# Patient Record
Sex: MALE | Race: WHITE | Employment: FULL TIME | URBAN - METROPOLITAN AREA
[De-identification: names, ages, dates, MRNs, and addresses within clinical notes are randomized per-mention and may not be internally consistent; named-entity substitution may affect disease eponyms.]

---

## 2019-02-05 ENCOUNTER — OFFICE VISIT (OUTPATIENT)
Dept: ORTHOPEDIC SURGERY | Age: 44
End: 2019-02-05
Payer: COMMERCIAL

## 2019-02-05 VITALS — HEIGHT: 71 IN | BODY MASS INDEX: 25.62 KG/M2 | WEIGHT: 183 LBS

## 2019-02-05 DIAGNOSIS — M25.522 LEFT ELBOW PAIN: Primary | ICD-10-CM

## 2019-02-05 DIAGNOSIS — M77.12 LATERAL EPICONDYLITIS OF LEFT ELBOW: ICD-10-CM

## 2019-02-05 PROCEDURE — 99203 OFFICE O/P NEW LOW 30 MIN: CPT | Performed by: ORTHOPAEDIC SURGERY

## 2019-02-05 RX ORDER — METHYLPREDNISOLONE 4 MG/1
TABLET ORAL
Qty: 1 KIT | Refills: 0 | Status: SHIPPED | OUTPATIENT
Start: 2019-02-05 | End: 2019-02-11

## 2019-02-18 DIAGNOSIS — M77.12 LATERAL EPICONDYLITIS OF LEFT ELBOW: Primary | ICD-10-CM

## 2019-02-19 ENCOUNTER — TELEPHONE (OUTPATIENT)
Dept: ORTHOPEDIC SURGERY | Age: 44
End: 2019-02-19

## 2019-02-25 ENCOUNTER — OFFICE VISIT (OUTPATIENT)
Dept: ORTHOPEDIC SURGERY | Age: 44
End: 2019-02-25
Payer: COMMERCIAL

## 2019-02-25 ENCOUNTER — TELEPHONE (OUTPATIENT)
Dept: ORTHOPEDIC SURGERY | Age: 44
End: 2019-02-25

## 2019-02-25 VITALS
WEIGHT: 182.98 LBS | HEIGHT: 71 IN | HEART RATE: 65 BPM | BODY MASS INDEX: 25.62 KG/M2 | DIASTOLIC BLOOD PRESSURE: 81 MMHG | SYSTOLIC BLOOD PRESSURE: 123 MMHG

## 2019-02-25 DIAGNOSIS — M77.12 LATERAL EPICONDYLITIS OF LEFT ELBOW: Primary | ICD-10-CM

## 2019-02-25 PROCEDURE — 99213 OFFICE O/P EST LOW 20 MIN: CPT | Performed by: ORTHOPAEDIC SURGERY

## 2019-02-27 ENCOUNTER — ANESTHESIA EVENT (OUTPATIENT)
Dept: OPERATING ROOM | Age: 44
End: 2019-02-27
Payer: COMMERCIAL

## 2019-02-28 ENCOUNTER — HOSPITAL ENCOUNTER (OUTPATIENT)
Age: 44
Setting detail: OUTPATIENT SURGERY
Discharge: HOME | End: 2019-02-28
Attending: ORTHOPAEDIC SURGERY | Admitting: ORTHOPAEDIC SURGERY
Payer: COMMERCIAL

## 2019-02-28 ENCOUNTER — ANESTHESIA (OUTPATIENT)
Dept: OPERATING ROOM | Age: 44
End: 2019-02-28
Payer: COMMERCIAL

## 2019-02-28 VITALS
HEART RATE: 67 BPM | DIASTOLIC BLOOD PRESSURE: 74 MMHG | OXYGEN SATURATION: 97 % | SYSTOLIC BLOOD PRESSURE: 118 MMHG | RESPIRATION RATE: 16 BRPM | HEIGHT: 71 IN | BODY MASS INDEX: 25.48 KG/M2 | TEMPERATURE: 97 F | WEIGHT: 182 LBS

## 2019-02-28 VITALS
DIASTOLIC BLOOD PRESSURE: 56 MMHG | RESPIRATION RATE: 13 BRPM | SYSTOLIC BLOOD PRESSURE: 101 MMHG | OXYGEN SATURATION: 98 %

## 2019-02-28 DIAGNOSIS — M77.12 LATERAL EPICONDYLITIS OF LEFT ELBOW: Primary | ICD-10-CM

## 2019-02-28 PROCEDURE — 7100000001 HC PACU RECOVERY - ADDTL 15 MIN: Performed by: ORTHOPAEDIC SURGERY

## 2019-02-28 PROCEDURE — 2580000003 HC RX 258: Performed by: ANESTHESIOLOGY

## 2019-02-28 PROCEDURE — 3700000000 HC ANESTHESIA ATTENDED CARE: Performed by: ORTHOPAEDIC SURGERY

## 2019-02-28 PROCEDURE — 2500000003 HC RX 250 WO HCPCS

## 2019-02-28 PROCEDURE — 3600000012 HC SURGERY LEVEL 2 ADDTL 15MIN: Performed by: ORTHOPAEDIC SURGERY

## 2019-02-28 PROCEDURE — 3600000002 HC SURGERY LEVEL 2 BASE: Performed by: ORTHOPAEDIC SURGERY

## 2019-02-28 PROCEDURE — 7100000011 HC PHASE II RECOVERY - ADDTL 15 MIN: Performed by: ORTHOPAEDIC SURGERY

## 2019-02-28 PROCEDURE — 7100000000 HC PACU RECOVERY - FIRST 15 MIN: Performed by: ORTHOPAEDIC SURGERY

## 2019-02-28 PROCEDURE — 2500000003 HC RX 250 WO HCPCS: Performed by: ORTHOPAEDIC SURGERY

## 2019-02-28 PROCEDURE — 2500000003 HC RX 250 WO HCPCS: Performed by: ANESTHESIOLOGY

## 2019-02-28 PROCEDURE — 6360000002 HC RX W HCPCS

## 2019-02-28 PROCEDURE — 2580000003 HC RX 258

## 2019-02-28 PROCEDURE — 7100000010 HC PHASE II RECOVERY - FIRST 15 MIN: Performed by: ORTHOPAEDIC SURGERY

## 2019-02-28 PROCEDURE — 2709999900 HC NON-CHARGEABLE SUPPLY: Performed by: ORTHOPAEDIC SURGERY

## 2019-02-28 PROCEDURE — 3700000001 HC ADD 15 MINUTES (ANESTHESIA): Performed by: ORTHOPAEDIC SURGERY

## 2019-02-28 PROCEDURE — 6360000002 HC RX W HCPCS: Performed by: ANESTHESIOLOGY

## 2019-02-28 RX ORDER — LIDOCAINE HYDROCHLORIDE 10 MG/ML
1 INJECTION, SOLUTION EPIDURAL; INFILTRATION; INTRACAUDAL; PERINEURAL
Status: COMPLETED | OUTPATIENT
Start: 2019-02-28 | End: 2019-02-28

## 2019-02-28 RX ORDER — PROPOFOL 10 MG/ML
INJECTION, EMULSION INTRAVENOUS PRN
Status: DISCONTINUED | OUTPATIENT
Start: 2019-02-28 | End: 2019-02-28 | Stop reason: SDUPTHER

## 2019-02-28 RX ORDER — HYDROCODONE BITARTRATE AND ACETAMINOPHEN 5; 325 MG/1; MG/1
1 TABLET ORAL EVERY 6 HOURS PRN
Qty: 25 TABLET | Refills: 0 | Status: SHIPPED | OUTPATIENT
Start: 2019-02-28 | End: 2019-03-07

## 2019-02-28 RX ORDER — MEPERIDINE HYDROCHLORIDE 25 MG/ML
12.5 INJECTION INTRAMUSCULAR; INTRAVENOUS; SUBCUTANEOUS EVERY 5 MIN PRN
Status: DISCONTINUED | OUTPATIENT
Start: 2019-02-28 | End: 2019-02-28 | Stop reason: HOSPADM

## 2019-02-28 RX ORDER — PROMETHAZINE HYDROCHLORIDE 25 MG/ML
6.25 INJECTION, SOLUTION INTRAMUSCULAR; INTRAVENOUS
Status: DISCONTINUED | OUTPATIENT
Start: 2019-02-28 | End: 2019-02-28 | Stop reason: HOSPADM

## 2019-02-28 RX ORDER — BUPIVACAINE HYDROCHLORIDE 2.5 MG/ML
INJECTION, SOLUTION EPIDURAL; INFILTRATION; INTRACAUDAL PRN
Status: DISCONTINUED | OUTPATIENT
Start: 2019-02-28 | End: 2019-02-28 | Stop reason: ALTCHOICE

## 2019-02-28 RX ORDER — SODIUM CHLORIDE 0.9 % (FLUSH) 0.9 %
10 SYRINGE (ML) INJECTION EVERY 12 HOURS SCHEDULED
Status: DISCONTINUED | OUTPATIENT
Start: 2019-02-28 | End: 2019-02-28 | Stop reason: HOSPADM

## 2019-02-28 RX ORDER — LIDOCAINE HYDROCHLORIDE 20 MG/ML
INJECTION, SOLUTION INFILTRATION; PERINEURAL PRN
Status: DISCONTINUED | OUTPATIENT
Start: 2019-02-28 | End: 2019-02-28 | Stop reason: SDUPTHER

## 2019-02-28 RX ORDER — ONDANSETRON 2 MG/ML
4 INJECTION INTRAMUSCULAR; INTRAVENOUS
Status: DISCONTINUED | OUTPATIENT
Start: 2019-02-28 | End: 2019-02-28 | Stop reason: HOSPADM

## 2019-02-28 RX ORDER — OXYCODONE HYDROCHLORIDE AND ACETAMINOPHEN 5; 325 MG/1; MG/1
2 TABLET ORAL PRN
Status: DISCONTINUED | OUTPATIENT
Start: 2019-02-28 | End: 2019-02-28 | Stop reason: HOSPADM

## 2019-02-28 RX ORDER — HYDRALAZINE HYDROCHLORIDE 20 MG/ML
5 INJECTION INTRAMUSCULAR; INTRAVENOUS EVERY 10 MIN PRN
Status: DISCONTINUED | OUTPATIENT
Start: 2019-02-28 | End: 2019-02-28 | Stop reason: HOSPADM

## 2019-02-28 RX ORDER — SODIUM CHLORIDE, SODIUM LACTATE, POTASSIUM CHLORIDE, CALCIUM CHLORIDE 600; 310; 30; 20 MG/100ML; MG/100ML; MG/100ML; MG/100ML
INJECTION, SOLUTION INTRAVENOUS CONTINUOUS PRN
Status: DISCONTINUED | OUTPATIENT
Start: 2019-02-28 | End: 2019-02-28 | Stop reason: SDUPTHER

## 2019-02-28 RX ORDER — SODIUM CHLORIDE, SODIUM LACTATE, POTASSIUM CHLORIDE, CALCIUM CHLORIDE 600; 310; 30; 20 MG/100ML; MG/100ML; MG/100ML; MG/100ML
INJECTION, SOLUTION INTRAVENOUS
Status: COMPLETED
Start: 2019-02-28 | End: 2019-02-28

## 2019-02-28 RX ORDER — MORPHINE SULFATE 10 MG/ML
2 INJECTION, SOLUTION INTRAMUSCULAR; INTRAVENOUS EVERY 5 MIN PRN
Status: DISCONTINUED | OUTPATIENT
Start: 2019-02-28 | End: 2019-02-28 | Stop reason: HOSPADM

## 2019-02-28 RX ORDER — ONDANSETRON 2 MG/ML
INJECTION INTRAMUSCULAR; INTRAVENOUS PRN
Status: DISCONTINUED | OUTPATIENT
Start: 2019-02-28 | End: 2019-02-28 | Stop reason: SDUPTHER

## 2019-02-28 RX ORDER — FENTANYL CITRATE 50 UG/ML
INJECTION, SOLUTION INTRAMUSCULAR; INTRAVENOUS PRN
Status: DISCONTINUED | OUTPATIENT
Start: 2019-02-28 | End: 2019-02-28 | Stop reason: SDUPTHER

## 2019-02-28 RX ORDER — DIPHENHYDRAMINE HYDROCHLORIDE 50 MG/ML
12.5 INJECTION INTRAMUSCULAR; INTRAVENOUS
Status: DISCONTINUED | OUTPATIENT
Start: 2019-02-28 | End: 2019-02-28 | Stop reason: HOSPADM

## 2019-02-28 RX ORDER — OXYCODONE HYDROCHLORIDE AND ACETAMINOPHEN 5; 325 MG/1; MG/1
1 TABLET ORAL PRN
Status: DISCONTINUED | OUTPATIENT
Start: 2019-02-28 | End: 2019-02-28 | Stop reason: HOSPADM

## 2019-02-28 RX ORDER — SODIUM CHLORIDE 0.9 % (FLUSH) 0.9 %
10 SYRINGE (ML) INJECTION PRN
Status: DISCONTINUED | OUTPATIENT
Start: 2019-02-28 | End: 2019-02-28 | Stop reason: HOSPADM

## 2019-02-28 RX ORDER — LABETALOL HYDROCHLORIDE 5 MG/ML
5 INJECTION, SOLUTION INTRAVENOUS EVERY 10 MIN PRN
Status: DISCONTINUED | OUTPATIENT
Start: 2019-02-28 | End: 2019-02-28 | Stop reason: HOSPADM

## 2019-02-28 RX ORDER — SODIUM CHLORIDE, SODIUM LACTATE, POTASSIUM CHLORIDE, CALCIUM CHLORIDE 600; 310; 30; 20 MG/100ML; MG/100ML; MG/100ML; MG/100ML
INJECTION, SOLUTION INTRAVENOUS CONTINUOUS
Status: DISCONTINUED | OUTPATIENT
Start: 2019-02-28 | End: 2019-02-28 | Stop reason: HOSPADM

## 2019-02-28 RX ORDER — CEFAZOLIN SODIUM 2 G/50ML
SOLUTION INTRAVENOUS
Status: COMPLETED
Start: 2019-02-28 | End: 2019-02-28

## 2019-02-28 RX ORDER — CEFAZOLIN SODIUM 2 G/50ML
2 SOLUTION INTRAVENOUS ONCE
Status: COMPLETED | OUTPATIENT
Start: 2019-02-28 | End: 2019-02-28

## 2019-02-28 RX ORDER — MORPHINE SULFATE 10 MG/ML
1 INJECTION, SOLUTION INTRAMUSCULAR; INTRAVENOUS EVERY 5 MIN PRN
Status: DISCONTINUED | OUTPATIENT
Start: 2019-02-28 | End: 2019-02-28 | Stop reason: HOSPADM

## 2019-02-28 RX ORDER — LIDOCAINE HYDROCHLORIDE 10 MG/ML
INJECTION, SOLUTION EPIDURAL; INFILTRATION; INTRACAUDAL; PERINEURAL
Status: COMPLETED
Start: 2019-02-28 | End: 2019-02-28

## 2019-02-28 RX ADMIN — CEFAZOLIN SODIUM 2 G: 2 SOLUTION INTRAVENOUS at 08:24

## 2019-02-28 RX ADMIN — LIDOCAINE HYDROCHLORIDE 0.1 ML: 10 INJECTION, SOLUTION EPIDURAL; INFILTRATION; INTRACAUDAL; PERINEURAL at 07:17

## 2019-02-28 RX ADMIN — LIDOCAINE HYDROCHLORIDE 50 MG: 20 INJECTION, SOLUTION INFILTRATION; PERINEURAL at 08:32

## 2019-02-28 RX ADMIN — SODIUM CHLORIDE, POTASSIUM CHLORIDE, SODIUM LACTATE AND CALCIUM CHLORIDE: 600; 310; 30; 20 INJECTION, SOLUTION INTRAVENOUS at 08:29

## 2019-02-28 RX ADMIN — PROPOFOL 200 MG: 10 INJECTION, EMULSION INTRAVENOUS at 08:32

## 2019-02-28 RX ADMIN — SODIUM CHLORIDE, SODIUM LACTATE, POTASSIUM CHLORIDE, CALCIUM CHLORIDE 1000 ML: 600; 310; 30; 20 INJECTION, SOLUTION INTRAVENOUS at 07:18

## 2019-02-28 RX ADMIN — ONDANSETRON 4 MG: 2 INJECTION, SOLUTION INTRAMUSCULAR; INTRAVENOUS at 08:32

## 2019-02-28 RX ADMIN — FENTANYL CITRATE 50 MCG: 50 INJECTION INTRAMUSCULAR; INTRAVENOUS at 08:40

## 2019-02-28 RX ADMIN — SODIUM CHLORIDE, POTASSIUM CHLORIDE, SODIUM LACTATE AND CALCIUM CHLORIDE 1000 ML: 600; 310; 30; 20 INJECTION, SOLUTION INTRAVENOUS at 07:18

## 2019-02-28 RX ADMIN — FENTANYL CITRATE 50 MCG: 50 INJECTION INTRAMUSCULAR; INTRAVENOUS at 08:32

## 2019-02-28 ASSESSMENT — PULMONARY FUNCTION TESTS
PIF_VALUE: 4
PIF_VALUE: 3
PIF_VALUE: 2
PIF_VALUE: 3
PIF_VALUE: 2
PIF_VALUE: 4
PIF_VALUE: 2
PIF_VALUE: 4
PIF_VALUE: 2
PIF_VALUE: 2
PIF_VALUE: 5
PIF_VALUE: 2
PIF_VALUE: 2
PIF_VALUE: 1
PIF_VALUE: 2
PIF_VALUE: 16
PIF_VALUE: 2
PIF_VALUE: 1
PIF_VALUE: 2
PIF_VALUE: 3
PIF_VALUE: 4
PIF_VALUE: 2
PIF_VALUE: 22
PIF_VALUE: 2
PIF_VALUE: 4
PIF_VALUE: 2

## 2019-02-28 ASSESSMENT — PAIN DESCRIPTION - DESCRIPTORS: DESCRIPTORS: ACHING;CONSTANT

## 2019-02-28 ASSESSMENT — PAIN - FUNCTIONAL ASSESSMENT
PAIN_FUNCTIONAL_ASSESSMENT: 0-10
PAIN_FUNCTIONAL_ASSESSMENT: PREVENTS OR INTERFERES SOME ACTIVE ACTIVITIES AND ADLS

## 2019-03-04 ENCOUNTER — HOSPITAL ENCOUNTER (OUTPATIENT)
Dept: OCCUPATIONAL THERAPY | Age: 44
Setting detail: THERAPIES SERIES
Discharge: HOME | End: 2019-03-04
Payer: COMMERCIAL

## 2019-03-04 PROCEDURE — 97165 OT EVAL LOW COMPLEX 30 MIN: CPT | Performed by: OCCUPATIONAL THERAPIST

## 2019-03-04 PROCEDURE — 97112 NEUROMUSCULAR REEDUCATION: CPT | Performed by: OCCUPATIONAL THERAPIST

## 2019-03-12 ENCOUNTER — OFFICE VISIT (OUTPATIENT)
Dept: ORTHOPEDIC SURGERY | Age: 44
End: 2019-03-12

## 2019-03-12 VITALS — BODY MASS INDEX: 25.49 KG/M2 | HEIGHT: 71 IN | WEIGHT: 182.1 LBS

## 2019-03-12 DIAGNOSIS — M77.12 LATERAL EPICONDYLITIS OF LEFT ELBOW: Primary | ICD-10-CM

## 2019-03-12 PROCEDURE — 99024 POSTOP FOLLOW-UP VISIT: CPT | Performed by: ORTHOPAEDIC SURGERY

## 2019-03-15 ENCOUNTER — HOSPITAL ENCOUNTER (OUTPATIENT)
Dept: OCCUPATIONAL THERAPY | Age: 44
Setting detail: THERAPIES SERIES
Discharge: HOME | End: 2019-03-15
Payer: COMMERCIAL

## 2019-03-15 PROCEDURE — 97110 THERAPEUTIC EXERCISES: CPT | Performed by: OCCUPATIONAL THERAPIST

## 2019-03-15 PROCEDURE — 97112 NEUROMUSCULAR REEDUCATION: CPT | Performed by: OCCUPATIONAL THERAPIST

## 2019-03-15 PROCEDURE — 97140 MANUAL THERAPY 1/> REGIONS: CPT | Performed by: OCCUPATIONAL THERAPIST

## 2019-03-15 PROCEDURE — G0283 ELEC STIM OTHER THAN WOUND: HCPCS | Performed by: OCCUPATIONAL THERAPIST

## 2019-03-29 ENCOUNTER — HOSPITAL ENCOUNTER (OUTPATIENT)
Dept: OCCUPATIONAL THERAPY | Age: 44
Setting detail: THERAPIES SERIES
Discharge: HOME | End: 2019-03-29
Payer: COMMERCIAL

## 2019-03-29 PROCEDURE — 97110 THERAPEUTIC EXERCISES: CPT | Performed by: OCCUPATIONAL THERAPIST

## 2019-03-29 PROCEDURE — 97140 MANUAL THERAPY 1/> REGIONS: CPT | Performed by: OCCUPATIONAL THERAPIST

## 2019-03-29 PROCEDURE — G0283 ELEC STIM OTHER THAN WOUND: HCPCS | Performed by: OCCUPATIONAL THERAPIST

## 2019-04-05 ENCOUNTER — HOSPITAL ENCOUNTER (OUTPATIENT)
Dept: OCCUPATIONAL THERAPY | Age: 44
Setting detail: THERAPIES SERIES
Discharge: HOME | End: 2019-04-05
Payer: COMMERCIAL

## 2019-04-05 PROCEDURE — 97140 MANUAL THERAPY 1/> REGIONS: CPT | Performed by: OCCUPATIONAL THERAPIST

## 2019-04-05 PROCEDURE — G0283 ELEC STIM OTHER THAN WOUND: HCPCS | Performed by: OCCUPATIONAL THERAPIST

## 2019-04-05 PROCEDURE — 97035 APP MDLTY 1+ULTRASOUND EA 15: CPT | Performed by: OCCUPATIONAL THERAPIST

## 2019-04-05 PROCEDURE — 97110 THERAPEUTIC EXERCISES: CPT | Performed by: OCCUPATIONAL THERAPIST

## 2019-04-05 NOTE — FLOWSHEET NOTE
stretch    Shoulder Flex  Shoulder Abd  Shoulder IR/ER WFL    Edema in cm circumf. MCPJs R:  L:    Edema in cm circumf. Wrist R:  L:     strength in lbs NT    Pinch Strengthin lbs: lat  R:  L:    Pinch Strength in lbs:  3 point R:  L:       MMT:                  Modalities: 3/4/19   3/15/19 3/29/19  4/5/19    HP 10'   US x 8' cont    INF + HP  15' 15' 10'   Therapeutic Exercise & Activities:       Pt educ HEP-AROMprecautions      finisher  8' 8'    putty  Yellow rolling, shaping, kneading     Power  elbow tucked    Gentle forearm stretches with elbow slightly bent x 10 each     Corner stretch x 10   No money with no resistance x 15     SS x 15 #1 x 10 x 2       Flex bar red eccentric wrist x 15     S/p withh 1 wt x 15     Finisher 5# x 5'    Mid row red x 15 x 2       Added above for home                                          Therapeutic Exercise and NMR EXR  [x] (28690) Provided verbal/tactile cueing for activities related to strengthening, flexibility, endurance, ROM  for improvements in scapular, scapulothoracic and UE control with self care, reaching, carrying, lifting, house/yardwork, driving/computer work.    [] (28276) Provided verbal/tactile cueing for activities related to improving balance, coordination, kinesthetic sense, posture, motor skill, proprioception  to assist with  scapular, scapulothoracic and UE control with self care, reaching, carrying, lifting, house/yardwork, driving/computer work. Therapeutic Activities:    [] ( 23664) therapeutic activities, direct (one on one) patient contact. Use of dynamic activities to improve functional performance.     Activities of Daily Living:  [] (08000) Provided self-care/home management training (i.e., activities of daily living and compensatory training, meal preparation, safety procedures, and instructions in use of assistive technology devices/adaptive equipment)     Home Exercise Program:  3/4/19: See media for AROM HEP  [x] [phone]) Reviewed/Progressed HEP activities related to strengthening, flexibility, endurance, ROM of scapular, scapulothoracic and UE control with self care, reaching, carrying, lifting, house/yardwork, driving/computer work    Manual Treatments:  STM, scar mass 10'  [x] (68866) Provided manual therapy to mobilize soft tissue/joints of the UE for the purpose of modulating pain, promoting relaxation,  increasing ROM, reducing/eliminating soft tissue swelling/inflammation/restriction, improving soft tissue extensibility and allowing for proper ROM for normal function with self care, reaching, carrying, lifting, house/yardwork, driving/computer work    Splinting:  [] Fabrication of: L-code  [] (25477) Checkout for orthotic/prosthetic use, established patient   [] (93150) Orthotic management and training (fitting and assessment)  [] Comments:    Charges:  Timed Code Treatment Minutes: 38   Total Treatment Minutes: 48   [] EVAL (LOW) 77403   [] OT Re-eval (35288)  [] EVAL (MOD) 51421   [] EVAL (HIGH) 06072       [x] Chasity (14405) x      [] PXNJT(28439)  [] NMR (48903) x      [] Estim (attended) (47918)   [x] Manual ([address] [facility]) x       [x] US (84386)  [] TA (87234) x      [] Paraffin (27443)  [] ADL  (53 649 24 60) x     [] Splint/L code:    [x] Estim (unattended) (90714)  [] Other:  [] Fluidotherapy (11957)  [](95524) Checkout for orthotic use, established patient x       [] (53918) Orthotic mgmt & training  x        GOALS:  Short Term Goals: To be achieved in: 2 weeks  1. Independent in HEP and progression per patient tolerance, in order to prevent re-injury. 2. Patient will have a decrease in pain to facilitate improvement in movement, function, and ADLs as indicated by Functional Deficits.     Long Term Goals to be achieved in 6  weeks, including patient directed goals to address identified performance deficits:  1) Pt to be independent in graded HEP progression with a good level of effort and compliance.   2) Pt to report a score of 25 % or less on the Quick DASH disability questionnaire for increased performance with carrying, moving, and handling objects. 3) Pt will demonstrate increased ROM to [facility] for improved performance with self care tasks, home mgt tasks, recreational endeavors, carrying, and lifting. 4) Pt will demonstrate increased  strength to at least 50% of R hand for improved performance with self care tasks, home mgt tasks, recreational endeavors, carrying, and lifting. 5) Pt will have a decrease in pain to 2/10 or less to facilitate improvement in performance with self care tasks, home mgt tasks, recreational endeavors, carrying, and lifting. 6)    7)        Progression Towards Functional goals:  [x] Patient is progressing as expected towards functional goals listed. [] Progression is slowed due to complexities listed. [] Progression has been slowed due to co-morbidities. [] Plan just implemented, too soon to assess goals progression  [] Other:     ASSESSMENT:    Treatment/Activity Tolerance:  [x] Patient tolerated treatment well [] Patient limited by fatique  [] Patient limited by pain  [] Patient limited by other medical complications  [] Other:     Prognosis: [x] Good [] Fair  [] Poor    Patient Requires Follow-up: [x] Yes  [] No    PLAN: Recommend Occupational Therapy 2 times a week for 6 weeks  [x] Continue per plan of care [] Alter current plan (see comments)  [] Plan of care initiated [] Hold pending MD visit [] Discharge    Plan for next session: ROM.  Pain mgt, modalities, progress HEP    Electronically signed by: Neeru Montes OTR/EZEKIEL [id number]

## 2019-04-11 ENCOUNTER — HOSPITAL ENCOUNTER (OUTPATIENT)
Dept: OCCUPATIONAL THERAPY | Age: 44
Setting detail: THERAPIES SERIES
Discharge: HOME | End: 2019-04-11
Payer: COMMERCIAL

## 2019-04-11 PROCEDURE — G0283 ELEC STIM OTHER THAN WOUND: HCPCS | Performed by: OCCUPATIONAL THERAPIST

## 2019-04-11 PROCEDURE — 97035 APP MDLTY 1+ULTRASOUND EA 15: CPT | Performed by: OCCUPATIONAL THERAPIST

## 2019-04-11 PROCEDURE — 97140 MANUAL THERAPY 1/> REGIONS: CPT | Performed by: OCCUPATIONAL THERAPIST

## 2019-04-11 PROCEDURE — 97110 THERAPEUTIC EXERCISES: CPT | Performed by: OCCUPATIONAL THERAPIST

## 2019-04-11 NOTE — FLOWSHEET NOTE
[facility], [address]  Phone: [phone]  Fax [phone]  Hand Therapy Daily Treatment Note  Patient: Bc Mcneil        \"Salvador\"               : 1975                                  MRN: [id number]  Referring Physician: Referring Practitioner: Luzma Dunham                                                Evaluation Date: 3/4/2019                                           Medical Diagnosis Information:  Diagnosis: M77.12 (ICD-10-CM) - Lateral epicondylitis of left elbow;   Treatment Dx L elbow painM25. 522                      Date of injury:18  Date and type of Surgery:19                                   Insurance information:  BCBS  61 visits ; no co-pay    Comorbidities Affecting Functional Performance:     []Anxiety (F41.9)/Depression (F32.9)   []Diabetes Type 1(E10.65) or 2 (E11.65)   []Rheumatoid Arthritis (M05.9)  []Fibromyalgia (M79.7)  []Neuropathy(G60.9)  []Osteoarthritis(M19.91)  []None   []Other:    Date of Patient follow up with Physician: 3/12/19  Preferred Language for Healthcare:   [x]English       []other:  Latex Allergy:  [x]NO      []YES  RESTRICTIONS/PRECAUTIONS:    Progress Note: [x]  Yes  []  No  Next due by : Visit #10       Visit # Insurance Allowable Requires auth   5 60    no:[]                  yes:[]    From 3/4/19 to 2019    Pain level:  2-3/10 with use      SUBJECTIVE: My neck hurt so bad this past weekend     Relevant Medical History/history of current problem: Pt reports injury likely from playing excessive golf        OBJECTIVE:   Date:  Hand Dominance:     [x]  Right    [] Left 3/4/2019    3/29/19    Objective Measures:      PAIN 4-7/10 4/10   Quick DASH 59%    Digits tip to DPFC in cm WFL    Thumb ROM WFL    Thumb opposition  WFL    Wrist ROM Ext/Flex 45/70:    Rad/Uln dev ROM R:  L:    Forearm ROM  Sup/pron 67/82    Elbow ROM Ext/flex 5/80 WNL's after heat and stretch    Shoulder Flex  Shoulder Abd  Shoulder IR/ER WFL    Edema in cm circumf. MCPJs R:  L:    Edema in cm circumf. Wrist R:  L:     strength in lbs NT    Pinch Strengthin lbs: lat  R:  L:    Pinch Strength in lbs:  3 point R:  L:       MMT:                  Modalities: 3/4/19   3/15/19 3/29/19  4/5/19  4/11/19    HP 10'   US x 8' cont  US x 8' cont    INF + HP  15' 15' 10' 10'   Therapeutic Exercise & Activities:        Pt educ HEP-AROMprecautions       finisher  8' 8'  8' with 5#   putty  Yellow rolling, shaping, kneading      Power  elbow tucked    Gentle forearm stretches with elbow slightly bent x 10 each     Corner stretch x 10   No money with no resistance x 15     SS x 15 #1 x 10 x 2       Flex bar red eccentric wrist x 15     S/p withh 1 wt x 15     Finisher 5# x 5'    Mid row red x 15 x 2        X 20 each         Same             Forearm stretching with elbow slightly bent x 10       Added above for home   t band bicep tricep orange band x 15 x 2    (added for home)                                             Therapeutic Exercise and NMR EXR  [x] (44894) Provided verbal/tactile cueing for activities related to strengthening, flexibility, endurance, ROM  for improvements in scapular, scapulothoracic and UE control with self care, reaching, carrying, lifting, house/yardwork, driving/computer work.    [] (73724) Provided verbal/tactile cueing for activities related to improving balance, coordination, kinesthetic sense, posture, motor skill, proprioception  to assist with  scapular, scapulothoracic and UE control with self care, reaching, carrying, lifting, house/yardwork, driving/computer work. Therapeutic Activities:    [] ( 05271) therapeutic activities, direct (one on one) patient contact. Use of dynamic activities to improve functional performance.     Activities of Daily Living:  [] (59932) Provided self-care/home management training (i.e., activities of daily living and compensatory training, meal preparation, safety procedures, and instructions in use of assistive technology devices/adaptive equipment)     Home Exercise Program:  3/4/19: See media for AROM HEP  [x] (64136) Reviewed/Progressed HEP activities related to strengthening, flexibility, endurance, ROM of scapular, scapulothoracic and UE control with self care, reaching, carrying, lifting, house/yardwork, driving/computer work    Manual Treatments:  STM, scar mass 10'  [x] (48048) Provided manual therapy to mobilize soft tissue/joints of the UE for the purpose of modulating pain, promoting relaxation,  increasing ROM, reducing/eliminating soft tissue swelling/inflammation/restriction, improving soft tissue extensibility and allowing for proper ROM for normal function with self care, reaching, carrying, lifting, house/yardwork, driving/computer work    Splinting:  [] Fabrication of: L-code  [] (98518) Checkout for orthotic/prosthetic use, established patient   [] (23202) Orthotic management and training (fitting and assessment)  [] Comments:    Charges:  Timed Code Treatment Minutes: 38   Total Treatment Minutes: 48   [] EVAL (LOW) 24140   [] OT Re-eval (06533)  [] EVAL (MOD) 20001   [] EVAL (HIGH) 70648       [x] Chasity (35120) x      [] AFLFL(00522)  [] NMR (54228) x      [] Estim (attended) (41402)   [x] Manual ([id number]) x       [x] US (92516)  [] TA (06881) x      [] Paraffin (75093)  [] ADL  (88 649 24 60) x     [] Splint/L code:    [x] Estim (unattended) (56114)  [] Other:  [] Fluidotherapy (97664)  [](98044) Checkout for orthotic use, established patient x       [] (90385) Orthotic mgmt & training  x        GOALS:  Short Term Goals: To be achieved in: 2 weeks  1. Independent in HEP and progression per patient tolerance, in order to prevent re-injury.    2. Patient will have a decrease in pain to facilitate improvement in movement, function, and ADLs as indicated by Functional Deficits.     Long Term Goals to be achieved in 6 weeks, including patient directed goals to address identified performance deficits:  1) Pt to be independent in graded HEP progression with a good level of effort and compliance. 2) Pt to report a score of 25 % or less on the Quick DASH disability questionnaire for increased performance with carrying, moving, and handling objects. 3) Pt will demonstrate [facility] for improved performance with self care tasks, home mgt tasks, recreational endeavors, carrying, and lifting. 4) Pt will demonstrate increased  strength to at least 50% of R hand for improved performance with self care tasks, home mgt tasks, recreational endeavors, carrying, and lifting. 5) Pt will have a decrease in pain to 2/10 or less to facilitate improvement in performance with self care tasks, home mgt tasks, recreational endeavors, carrying, and lifting. 6)    7)        Progression Towards Functional goals:  [x] Patient is progressing as expected towards functional goals listed. [] Progression is slowed due to complexities listed. [] Progression has been slowed due to co-morbidities. [] Plan just implemented, too soon to assess goals progression  [] Other:     ASSESSMENT:    Treatment/Activity Tolerance:  [x] Patient tolerated treatment well [] Patient limited by fatique  [] Patient limited by pain  [] Patient limited by other medical complications  [] Other:     Prognosis: [x] Good [] Fair  [] Poor    Patient Requires Follow-up: [x] Yes  [] No    PLAN: Recommend Occupational Therapy 2 times a week for 6 weeks  [x] Continue per plan of care [] Alter current plan (see comments)  [] Plan of care initiated [] Hold pending MD visit [] Discharge    Plan for next session: ROM.  Pain mgt, modalities, progress HEP    Electronically signed by: Mickey Kellogg OTR/L [id number]

## 2019-04-19 ENCOUNTER — HOSPITAL ENCOUNTER (OUTPATIENT)
Dept: OCCUPATIONAL THERAPY | Age: 44
Setting detail: THERAPIES SERIES
Discharge: HOME | End: 2019-04-19
Payer: COMMERCIAL

## 2019-04-19 PROCEDURE — G0283 ELEC STIM OTHER THAN WOUND: HCPCS | Performed by: OCCUPATIONAL THERAPIST

## 2019-04-19 PROCEDURE — 97110 THERAPEUTIC EXERCISES: CPT | Performed by: OCCUPATIONAL THERAPIST

## 2019-04-19 PROCEDURE — 97140 MANUAL THERAPY 1/> REGIONS: CPT | Performed by: OCCUPATIONAL THERAPIST

## 2019-04-19 PROCEDURE — 97035 APP MDLTY 1+ULTRASOUND EA 15: CPT | Performed by: OCCUPATIONAL THERAPIST

## 2019-04-19 NOTE — FLOWSHEET NOTE
[facility], [address]  Phone: [phone]  Fax [phone]  Hand Therapy Daily Treatment Note  Patient: Lady Haq        \"Salvador\"               : 1975                                  MRN: [id number]  Referring Physician: Referring Practitioner: Gustavo Cadena                                                Evaluation Date: 3/4/2019                                           Medical Diagnosis Information:  Diagnosis: M77.12 (ICD-10-CM) - Lateral epicondylitis of left elbow;   Treatment Dx L elbow painM25. 522                      Date of injury:18  Date and type of Surgery:19                                   Insurance information:  BCBS  61 visits ; no co-pay    Comorbidities Affecting Functional Performance:     []Anxiety (F41.9)/Depression (F32.9)   []Diabetes Type 1(E10.65) or 2 (E11.65)   []Rheumatoid Arthritis (M05.9)  []Fibromyalgia (M79.7)  []Neuropathy(G60.9)  []Osteoarthritis(M19.91)  []None   []Other:    Date of Patient follow up with Physician: 3/12/19  Preferred Language for Healthcare:   [x]English       []other:  Latex Allergy:  [x]NO      []YES  RESTRICTIONS/PRECAUTIONS:    Progress Note: [x]  Yes  []  No  Next due by : Visit #10       Visit # Insurance Allowable Requires auth   8 60    no:[]                  yes:[]    From 3/4/19 to 2019    Pain level:  2-3/10 with use      SUBJECTIVE: I'm just nervous about it but it's doing good.        Relevant Medical History/history of current problem: Pt reports injury likely from playing excessive golf        OBJECTIVE:   Date:  Hand Dominance:     [x]  Right    [] Left 3/4/2019    3/29/19  4/12/19  4/19/19    Objective Measures:        PAIN 4-7/10 410     Quick DASH 59%      Digits tip to DPFC in cm WFL      Thumb ROM WFL      Thumb opposition  WFL      Wrist ROM Ext/Flex 45/70:      Rad/Uln dev ROM R:  L:      Forearm ROM  Sup/pron 67/82      Elbow ROM Ext/flex 5/80 WNL's after heat and stretch      Shoulder Flex  Shoulder Abd  Shoulder IR/ER WFL      Edema in cm circumf. MCPJs R:  L:      Edema in cm circumf.   Wrist R:  L:       strength in lbs NT  R: 65  L: 40 R: 100#  L: 63#   Pinch Strengthin lbs: lat  R:  L:      Pinch Strength in lbs:  3 point R:  L:         MMT:                    Modalities: 3/4/19   3/15/19 3/29/19  4/5/19  4/11/19  4/12/19  4/19/19    HP 10'   US x 8' cont  US x 8' cont  US x 8' US x 8'   INF + HP  15' 15' 10' 10' 15'  US 10'  US x 8' xcont   Therapeutic Exercise & Activities:          Pt educ HEP-AROMprecautions         finisher  8' 8'  8' with 5# 6#    putty  Yellow rolling, shaping, kneading        Power  elbow tucked    Gentle forearm stretches with elbow slightly bent x 10 each     Corner stretch x 10   No money with no resistance x 15     SS x 15 #1 x 10 x 2       Flex bar red eccentric wrist x 15     S/p withh 1 wt x 15     Finisher 5# x 5'    Mid row red x 15 x 2        X 20 each         Same             Forearm stretching with elbow slightly bent x 10  #2 x 20 x 2      Flex bar eccentric wrist x 15 x 2     2 wts x 15 x 2       6# x 5'      Mid row green 15 x 2  #2 x 20 x 2       Red x 20         Same             Eccentric 2# wrist curls x 15 x 2       Added above for home   t band bicep tricep orange band x 15 x 2    (added for home)  Green band mid row 15 x 2                                         Wall push up 15 x 2     Added for home                Therapeutic Exercise and NMR EXR  [x] (53717) Provided verbal/tactile cueing for activities related to strengthening, flexibility, endurance, ROM  for improvements in scapular, scapulothoracic and UE control with self care, reaching, carrying, lifting, house/yardwork, driving/computer work.    [] (57389) Provided verbal/tactile cueing for activities related to improving balance, coordination, kinesthetic sense, posture, motor skill, proprioception  to assist with  scapular, scapulothoracic and UE control with self care, reaching, carrying, lifting, house/yardwork, driving/computer work. Therapeutic Activities:    [] ( 95961) therapeutic activities, direct (one on one) patient contact. Use of dynamic activities to improve functional performance.     Activities of Daily Living:  [] (91865) Provided self-care/home management training (i.e., activities of daily living and compensatory training, meal preparation, safety procedures, and instructions in use of assistive technology devices/adaptive equipment)     Home Exercise Program:  3/4/19: See media for AROM HEP  [x] (51947) Reviewed/Progressed HEP activities related to strengthening, flexibility, endurance, ROM of scapular, scapulothoracic and UE control with self care, reaching, carrying, lifting, house/yardwork, driving/computer work    Manual Treatments:  CHONG, SASTM  scar mass 10'  [x] (65740) Provided manual therapy to mobilize soft tissue/joints of the UE for the purpose of modulating pain, promoting relaxation,  increasing ROM, reducing/eliminating soft tissue swelling/inflammation/restriction, improving soft tissue extensibility and allowing for proper ROM for normal function with self care, reaching, carrying, lifting, house/yardwork, driving/computer work    Splinting:  [] Fabrication of: L-code  [] (06138) Checkout for orthotic/prosthetic use, established patient   [] (27086) Orthotic management and training (fitting and assessment)  [] Comments:    Charges:  Timed Code Treatment Minutes: 38   Total Treatment Minutes: 50   [] EVAL (LOW) 40984   [] OT Re-eval (89707)  [] EVAL (MOD) 04486   [] EVAL (HIGH) 65159       [x] Chasity (93691) x      [] GXCUX(22564)  [] NMR (88087) x      [] Estim (attended) (27130)   [x] Manual ([id number]) x       [x] US (24906)  [] TA (50869) x      [] Paraffin (12213)  [] ADL  (24 649 24 60) x     [] Splint/L code:    [x] Estim (unattended) (35706)  [] Other:  [] Fluidotherapy (87454)  [](97379) Checkout for orthotic use, established patient x       [] (19421) Orthotic mgmt & training  x        GOALS:  Short Term Goals: To be achieved in: 2 weeks  1. Independent in HEP and progression per patient tolerance, in order to prevent re-injury. 2. Patient will have a decrease in pain to facilitate improvement in movement, function, and ADLs as indicated by Functional Deficits.     Long Term Goals to be achieved in 6  weeks, including patient directed goals to address identified performance deficits:  1) Pt to be independent in graded HEP progression with a good level of effort and compliance. 2) Pt to report a score of 25 % or less on the Quick DASH disability questionnaire for increased performance with carrying, moving, and handling objects. 3) Pt will demonstrate increased ROM to [facility] for improved performance with self care tasks, home mgt tasks, recreational endeavors, carrying, and lifting. 4) Pt will demonstrate increased  strength to at least 50% of R hand for improved performance with self care tasks, home mgt tasks, recreational endeavors, carrying, and lifting. 5) Pt will have a decrease in pain to 2/10 or less to facilitate improvement in performance with self care tasks, home mgt tasks, recreational endeavors, carrying, and lifting. 6)    7)        Progression Towards Functional goals:  [x] Patient is progressing as expected towards functional goals listed. [] Progression is slowed due to complexities listed. [] Progression has been slowed due to co-morbidities.   [] Plan just implemented, too soon to assess goals progression  [] Other:     ASSESSMENT:    Treatment/Activity Tolerance:  [x] Patient tolerated treatment well [] Patient limited by fatique  [] Patient limited by pain  [] Patient limited by other medical complications  [] Other:     Prognosis: [x] Good [] Fair  [] Poor    Patient Requires Follow-up: [x] Yes  [] No    PLAN: Recommend Occupational Therapy 2 times a week for 6 weeks  [x] Continue per plan of care [] Alter current plan (see comments)  [] Plan of care initiated [] Hold pending MD visit [] Discharge    Plan for next session: ROM.  Pain mgt, modalities, progress HEP    Electronically signed by: Lavelle Briceño OTR/L [id number]

## 2019-04-23 ENCOUNTER — OFFICE VISIT (OUTPATIENT)
Dept: ORTHOPEDIC SURGERY | Age: 44
End: 2019-04-23

## 2019-04-23 VITALS — HEIGHT: 71 IN | BODY MASS INDEX: 25.49 KG/M2 | WEIGHT: 182.1 LBS

## 2019-04-23 DIAGNOSIS — M77.12 LATERAL EPICONDYLITIS OF LEFT ELBOW: Primary | ICD-10-CM

## 2019-04-23 PROCEDURE — 99024 POSTOP FOLLOW-UP VISIT: CPT | Performed by: ORTHOPAEDIC SURGERY

## 2019-04-25 ENCOUNTER — HOSPITAL ENCOUNTER (OUTPATIENT)
Dept: OCCUPATIONAL THERAPY | Age: 44
Setting detail: THERAPIES SERIES
Discharge: HOME | End: 2019-04-25
Payer: COMMERCIAL

## 2019-04-25 PROCEDURE — 97110 THERAPEUTIC EXERCISES: CPT | Performed by: OCCUPATIONAL THERAPIST

## 2019-04-25 PROCEDURE — G0283 ELEC STIM OTHER THAN WOUND: HCPCS | Performed by: OCCUPATIONAL THERAPIST

## 2019-04-25 PROCEDURE — 97140 MANUAL THERAPY 1/> REGIONS: CPT | Performed by: OCCUPATIONAL THERAPIST

## 2019-04-25 NOTE — FLOWSHEET NOTE
[facility], [address]  Phone: [phone]  Fax [phone]  Hand Therapy Daily Treatment Note  Patient: Harrison Roberto        \"Salvador\"               : 1975                                  MRN: [id number]  Referring Physician: Referring Practitioner: Ge Cardona                                                Evaluation Date: 3/4/2019                                           Medical Diagnosis Information:  Diagnosis: M77.12 (ICD-10-CM) - Lateral epicondylitis of left elbow;   Treatment Dx L elbow painM25. 522                      Date of injury:18  Date and type of Surgery:19                                   Insurance information:  [employer]  61 visits ; no co-pay    Comorbidities Affecting Functional Performance:     []Anxiety (F41.9)/Depression (F32.9)   []Diabetes Type 1(E10.65) or 2 (E11.65)   []Rheumatoid Arthritis (M05.9)  []Fibromyalgia (M79.7)  []Neuropathy(G60.9)  []Osteoarthritis(M19.91)  []None   []Other:    Date of Patient follow up with Physician: 3/12/19  Preferred Language for Healthcare:   [x]English       []other:  Latex Allergy:  [x]NO      []YES  RESTRICTIONS/PRECAUTIONS:    Progress Note: [x]  Yes  []  No  Next due by : Visit #10       Visit # Insurance Allowable Requires auth   9 60    no:[]                  yes:[]    From 3/4/19 to 2019    Pain level:  2-3/10 with use      SUBJECTIVE: I was a little sore over medial elbow past couple of days.      Postitive tinels over cubital tunnel today 19       Relevant Medical History/history of current problem: Pt reports injury likely from playing excessive golf        OBJECTIVE:   Date:  Hand Dominance:     [x]  Right    [] Left 3/4/2019    3/29/19  4/12/19  4/19/19    Objective Measures:        PAIN 4-7/10 4/10     Quick DASH 59%      Digits tip to DPFC in cm WFL      Thumb ROM WFL      Thumb opposition  WFL      Wrist ROM Ext/Flex 45/70:      Rad/Uln dev ROM R:  L:      Forearm ROM  Sup/pron 67/82      Elbow ROM Ext/flex 5/80 WNL's after heat and stretch      Shoulder Flex  Shoulder Abd  Shoulder IR/ER WFL      Edema in cm circumf. MCPJs R:  L:      Edema in cm circumf.   Wrist R:  L:       strength in lbs NT  R: 65  L: 40 R: 100#  L: 63#   Pinch Strengthin lbs: lat  R:  L:      Pinch Strength in lbs:  3 point R:  L:         MMT:                    Modalities: 3/4/19   3/15/19 3/29/19  4/5/19  4/11/19  4/12/19  4/19/19  4/25/19    HP 10'   US x 8' cont  US x 8' cont  US x 8' US x 8' HP and IFC x 15'   INF + HP  15' 15' 10' 10' 15'  US 10'  US x 8' xcont    Therapeutic Exercise & Activities:           Pt educ HEP-AROMprecautions          finisher  8' 8'  8' with 5# 6#     putty  Yellow rolling, shaping, kneading         Power  elbow tucked    Gentle forearm stretches with elbow slightly bent x 10 each     Corner stretch x 10   No money with no resistance x 15     SS x 15 #1 x 10 x 2       Flex bar red eccentric wrist x 15     S/p withh 1 wt x 15     Finisher 5# x 5'    Mid row red x 15 x 2        X 20 each         Same             Forearm stretching with elbow slightly bent x 10  #2 x 20 x 2      Flex bar eccentric wrist x 15 x 2     2 wts x 15 x 2       6# x 5'      Mid row green 15 x 2  #2 x 20 x 2       Red x 20         Same             Eccentric 2# wrist curls x 15 x 2  Same       Same                     Bicep curls 5# 10 x 2    Finisher 3# diagonals x 5'    Ulnar n glides x 5 (added for home)      Added above for home   t band bicep tricep orange band x 15 x 2    (added for home)  Green band mid row 15 x 2                                             Wall push up 15 x 2     Added for home                  Therapeutic Exercise and NMR EXR  [x] (01180) Provided verbal/tactile cueing for activities related to strengthening, flexibility, endurance, ROM  for improvements in scapular, scapulothoracic and UE control with self care, reaching, carrying, lifting, house/yardwork, driving/computer work.    [] (24932) Provided verbal/tactile cueing for activities related to improving balance, coordination, kinesthetic sense, posture, motor skill, proprioception  to assist with  scapular, scapulothoracic and UE control with self care, reaching, carrying, lifting, house/yardwork, driving/computer work. Therapeutic Activities:    [] ( 07687) therapeutic activities, direct (one on one) patient contact. Use of dynamic activities to improve functional performance.     Activities of Daily Living:  [] (35875) Provided self-care/home management training (i.e., activities of daily living and compensatory training, meal preparation, safety procedures, and instructions in use of assistive technology devices/adaptive equipment)     Home Exercise Program:  3/4/19: See media for AROM HEP  [x] (02346) Reviewed/Progressed HEP activities related to strengthening, flexibility, endurance, ROM of scapular, scapulothoracic and UE control with self care, reaching, carrying, lifting, house/yardwork, driving/computer work    Manual Treatments:  STM, scar mass 10'  [x] (70591) Provided manual therapy to mobilize soft tissue/joints of the UE for the purpose of modulating pain, promoting relaxation,  increasing ROM, reducing/eliminating soft tissue swelling/inflammation/restriction, improving soft tissue extensibility and allowing for proper ROM for normal function with self care, reaching, carrying, lifting, house/yardwork, driving/computer work    Splinting:  [] Fabrication of: L-code  [] (96036) Checkout for orthotic/prosthetic use, established patient   [] (53890) Orthotic management and training (fitting and assessment)  [] Comments:    Charges:  Timed Code Treatment Minutes: 30   Total Treatment Minutes: 45   [] EVAL (LOW) 29833   [] OT Re-eval (20332)  [] EVAL (MOD) 55639   [] EVAL (HIGH) 15416       [x] Chasity (02080) x      [] DVTRS(61715)  [] NMR (05241) x [] Estim (attended) (71643)   [x] Manual ([id number]) x       [] US (85177)  [] TA (03927) x      [] Paraffin (52005)  [] ADL  ([phone]) x     [] Splint/L code:    [x] Estim (unattended) (75949)  [] Other:  [] Fluidotherapy (35810)  [](32518) Checkout for orthotic use, established patient x       [] (69329) Orthotic mgmt & training  x        GOALS:  Short Term Goals: To be achieved in: 2 weeks  1. Independent in HEP and progression per patient tolerance, in order to prevent re-injury. 2. Patient will have a decrease in pain to facilitate improvement in movement, function, and ADLs as indicated by Functional Deficits.     Long Term Goals to be achieved in 6  weeks, including patient directed goals to address identified performance deficits:  1) Pt to be independent in graded HEP progression with a good level of effort and compliance. 2) Pt to report a score of 25 % or less on the Quick DASH disability questionnaire for increased performance with carrying, moving, and handling objects. 3) Pt will demonstrate increased ROM to [facility] for improved performance with self care tasks, home mgt tasks, recreational endeavors, carrying, and lifting. 4) Pt will demonstrate increased  strength to at least 50% of R hand for improved performance with self care tasks, home mgt tasks, recreational endeavors, carrying, and lifting. 5) Pt will have a decrease in pain to 2/10 or less to facilitate improvement in performance with self care tasks, home mgt tasks, recreational endeavors, carrying, and lifting. 6)    7)        Progression Towards Functional goals:  [x] Patient is progressing as expected towards functional goals listed. [] Progression is slowed due to complexities listed. [] Progression has been slowed due to co-morbidities.   [] Plan just implemented, too soon to assess goals progression  [] Other:     ASSESSMENT:    Treatment/Activity Tolerance:  [x] Patient tolerated treatment well [] Patient limited by janny  [] Patient limited by pain  [] Patient limited by other medical complications  [] Other:     Prognosis: [x] Good [] Fair  [] Poor    Patient Requires Follow-up: [x] Yes  [] No    PLAN: Recommend Occupational Therapy 2 times a week for 6 weeks  [x] Continue per plan of care [] Alter current plan (see comments)  [] Plan of care initiated [] Hold pending MD visit [] Discharge    Plan for next session: ROM.  Pain mgt, modalities, progress HEP    Electronically signed by: Karen Bell OTR/L [id number]

## 2019-05-02 ENCOUNTER — HOSPITAL ENCOUNTER (OUTPATIENT)
Dept: OCCUPATIONAL THERAPY | Age: 44
Setting detail: THERAPIES SERIES
Discharge: HOME | End: 2019-05-02
Payer: COMMERCIAL

## 2019-05-02 PROCEDURE — 97140 MANUAL THERAPY 1/> REGIONS: CPT | Performed by: OCCUPATIONAL THERAPIST

## 2019-05-02 PROCEDURE — 97110 THERAPEUTIC EXERCISES: CPT | Performed by: OCCUPATIONAL THERAPIST

## 2019-05-02 PROCEDURE — 97035 APP MDLTY 1+ULTRASOUND EA 15: CPT | Performed by: OCCUPATIONAL THERAPIST

## 2019-05-02 NOTE — PLAN OF CARE
[facility], [address]  Phone: [phone]  Fax [phone]        Occupational Therapy 10th Visit Progress Note    Date: 2019        Patient Name:  Urmila Lino    :  1975  MRN: [id number]  Referring Physician: Referring Practitioner: Anum Pichardo  Evaluation Date: 3/4/2019                                           Medical Diagnosis Information:  Diagnosis: M77.12 (ICD-10-CM) - Lateral epicondylitis of left elbow;   Treatment Dx L elbow painM25. 522                    Date of injury:18  Date and type of Surgery:19     Insurance information:  [facility]  60 visits ; no co-pay     Comorbidities Affecting Functional Performance:             []Anxiety (F41.9)/Depression (F32.9)     []Diabetes Type 1(E10.65) or 2 (E11.65)           []Rheumatoid Arthritis (M05.9)  []Fibromyalgia (M79.7)  []Neuropathy(G60.9)  []Osteoarthritis(M19.91)  []None      []Other:     Date of Patient follow up with Physician: 3/12/19  Preferred Language for Healthcare:   [x]English       []other:  Latex Allergy:  [x]NO      []YES  RESTRICTIONS/PRECAUTIONS:    Progress Note:          [x]  Yes                []  No                  Next due by : Visit #10        Visit # Insurance Allowable Requires auth   10 61    no:[]                  yes:[]    From 3/4/19 to 2019     Pain level:  1-2/10 with use         SUBJECTIVE: I was able to chip and putt with minimal discomfort               OBJECTIVE:   Date:  Hand Dominance:     [x]  Right    [] Left 3/4/2019    3/29/19  4/12/19  4/19/19  5/2/19    Objective Measures:             PAIN -7/10 4/10     1-2   Quick DASH 59%       18%   Digits tip to DPFC in cm WFL           Thumb ROM WFL           Thumb opposition  WFL           Wrist ROM Ext/Flex 45/70:           Rad/Uln dev ROM R:  L:           Forearm ROM  Sup/pron 67/82         Elbow ROM Ext/flex 5/80 WNL's after heat and stretch          Shoulder Flex  Shoulder Abd  Shoulder IR/ER WFL           Edema in cm circumf. MCPJs R:  L:           Edema in cm circumf. Wrist R:  L:            strength in lbs NT   R: 65  L: 40 R: 100#  L: 63#    L: 75#   Pinch Strengthin lbs: lat  R:  L:           Pinch Strength in lbs:  3 point R:  L:              MMT:          tricep    R 4-/5  L 3+/5  Bicep   R: 4-/5  L: 3+/5                Treatment to date:  [x] Therapeutic Exercise    [x] Modalities:  [x] Therapeutic Activity             [x]Ultrasound            []Electrical Stimulation  [] Gait Training     []Cervical Traction    [] Lumbar Traction  [x] Neuromuscular Re-education [] Cold/hotpack         []Iontophoresis  [x] Instruction in HEP      Other:  [x] Manual Therapy                   []                       ?           []  Assessment:  [x] Improvement noted relative to goals:  [] No Improvement noted related to goals:/Patient's response to treatment/Additional assessment:    GOALS:  Short Term Goals: To be achieved in: 2 weeks  1. Independent in HEP and progression per patient tolerance, in order to prevent re-injury. 2. Patient will have a decrease in pain to facilitate improvement in movement, function, and ADLs as indicated by Functional Deficits.     Long Term Goals to be achieved in 6  weeks, including patient directed goals to address identified performance deficits:  1) Pt to be independent in graded HEP progression with a good level of effort and compliance. MET  2) Pt to report a score of 25 % or less on the Quick DASH disability questionnaire for increased performance with carrying, moving, and handling objects. MET  3) Pt will demonstrate increased ROM to WFL for improved performance with self care tasks, home mgt tasks, recreational endeavors, carrying, and lifting.  MET  4) Pt will demonstrate increased  strength to at least 50% of R hand for improved performance with self care tasks, home mgt tasks, recreational endeavors, carrying, and lifting. MET  5) Pt will have a decrease in pain to 2/10 or less to facilitate improvement in performance with self care tasks, home mgt tasks, recreational endeavors, carrying, and lifting. NOT MET     New goal: Pt. Will increase L  strength to 85# for opening things and doing house/yard work.    Set 5/2/19         New or Updated Goals (if applicable):  [] No change to goals established upon initial eval/last progress note:  New Goals: above     Cont OT 2x/week for 4 more weeks     Electronically signed by:  Sebas Dillard OTR/EZEKIEL [id number]

## 2019-05-02 NOTE — FLOWSHEET NOTE
[facility], [address]  Phone: [phone]  Fax [phone]  Hand Therapy Daily Treatment Note  Patient: Harrison Roberto        \"Salv[place]\"               : 1975                                  MRN: [id number]  Referring Physician: Referring Practitioner: Ge Cardona                                                Evaluation Date: 3/4/2019                                           Medical Diagnosis Information:  Diagnosis: M77.12 (ICD-10-CM) - Lateral epicondylitis of left elbow;   Treatment Dx L elbow painM25. 522                      Date of injury:18  Date and type of Surgery:19                                   Insurance information:  [employer]  61 visits ; no co-pay    Comorbidities Affecting Functional Performance:     []Anxiety (F41.9)/Depression (F32.9)   []Diabetes Type 1(E10.65) or 2 (E11.65)   []Rheumatoid Arthritis (M05.9)  []Fibromyalgia (M79.7)  []Neuropathy(G60.9)  []Osteoarthritis(M19.91)  []None   []Other:    Date of Patient follow up with Physician: 3/12/19  Preferred Language for Healthcare:   [x]English       []other:  Latex Allergy:  [x]NO      []YES  RESTRICTIONS/PRECAUTIONS:    Progress Note: [x]  Yes  []  No  Next due by : Visit #10       Visit # Insurance Allowable Requires auth   10 60    no:[]                  yes:[]    From 3/4/19 to 2019    Pain level:  1-2/10 with use      SUBJECTIVE: I was able to chip and putt with minimal discomfort      Postitive tinels over cubital tunnel today 19       Relevant Medical History/history of current problem: Pt reports injury likely from playing excessive golf        OBJECTIVE:   Date:  Hand Dominance:     [x]  Right    [] Left 3/4/2019    3/29/19  4/12/19  4/19/19  5/2/19    Objective Measures:         PAIN 4-7/10 4/10   1-2   Quick DASH 59%    18%   Digits tip to DPFC in cm WFL       Thumb ROM WFL       Thumb opposition  [facility] tricep 15 x 2                                               Wall push up 15 x 2     Added for home   Wall push up 15 x 2                  Therapeutic Exercise and NMR EXR  [x] (94975) Provided verbal/tactile cueing for activities related to strengthening, flexibility, endurance, ROM  for improvements in scapular, scapulothoracic and UE control with self care, reaching, carrying, lifting, house/yardwork, driving/computer work.    [] (81334) Provided verbal/tactile cueing for activities related to improving balance, coordination, kinesthetic sense, posture, motor skill, proprioception  to assist with  scapular, scapulothoracic and UE control with self care, reaching, carrying, lifting, house/yardwork, driving/computer work. Therapeutic Activities:    [] ( 80211) therapeutic activities, direct (one on one) patient contact. Use of dynamic activities to improve functional performance.     Activities of Daily Living:  [] (21615) Provided self-care/home management training (i.e., activities of daily living and compensatory training, meal preparation, safety procedures, and instructions in use of assistive technology devices/adaptive equipment)     Home Exercise Program:  3/4/19: See media for AROM HEP  [x] (26668) Reviewed/Progressed HEP activities related to strengthening, flexibility, endurance, ROM of scapular, scapulothoracic and UE control with self care, reaching, carrying, lifting, house/yardwork, driving/computer work    Manual Treatments:  STM, scar mass 10'  [x] (61844) Provided manual therapy to mobilize soft tissue/joints of the UE for the purpose of modulating pain, promoting relaxation,  increasing ROM, reducing/eliminating soft tissue swelling/inflammation/restriction, improving soft tissue extensibility and allowing for proper ROM for normal function with self care, reaching, carrying, lifting, house/yardwork, driving/computer work    Splinting:  [] Fabrication of: L-code  [] (63059) Checkout for orthotic/prosthetic use, established patient   [] (08016) Orthotic management and training (fitting and assessment)  [] Comments:    Charges:  Timed Code Treatment Minutes: 45   Total Treatment Minutes: 45   [] EVAL (LOW) 16300   [] OT Re-eval (75678)  [] EVAL (MOD) 88743   [] EVAL (HIGH) 20521       [x] Chasity (60065) x      [] FHFJD(90930)  [] NMR (00521) x      [] Estim (attended) (89073)   [x] Manual ([id number]) x       [x] US (34053)  [] TA (40648) x      [] Paraffin (93455)  [] ADL  (07599) x     [] Splint/L code:    [] Estim (unattended) (98216)  [] Other:  [] Fluidotherapy (35028)  [](21411) Checkout for orthotic use, established patient x       [] (54215) Orthotic mgmt & training  x        GOALS:  Short Term Goals: To be achieved in: 2 weeks  1. Independent in HEP and progression per patient tolerance, in order to prevent re-injury. 2. Patient will have a decrease in pain to facilitate improvement in movement, function, and ADLs as indicated by Functional Deficits.     Long Term Goals to be achieved in 6  weeks, including patient directed goals to address identified performance deficits:  1) Pt to be independent in graded HEP progression with a good level of effort and compliance. MET  2) Pt to report a score of 25 % or less on the Quick DASH disability questionnaire for increased performance with carrying, moving, and handling objects. MET  3) Pt will demonstrate increased ROM to WFL for improved performance with self care tasks, home mgt tasks, recreational endeavors, carrying, and lifting. MET  4) Pt will demonstrate increased  strength to at least 50% of R hand for improved performance with self care tasks, home mgt tasks, recreational endeavors, carrying, and lifting. MET  5) Pt will have a decrease in pain to 2/10 or less to facilitate improvement in performance with self care tasks, home mgt tasks, recreational endeavors, carrying, and lifting. NOT MET     New goal: Pt.  Will increase L  strength to 85# for opening things and doing house/yard work. Set 5/2/19       Progression Towards Functional goals:  [x] Patient is progressing as expected towards functional goals listed. [] Progression is slowed due to complexities listed. [] Progression has been slowed due to co-morbidities. [] Plan just implemented, too soon to assess goals progression  [] Other:     ASSESSMENT:    Treatment/Activity Tolerance:  [x] Patient tolerated treatment well [] Patient limited by fatique  [] Patient limited by pain  [] Patient limited by other medical complications  [] Other:     Prognosis: [x] Good [] Fair  [] Poor    Patient Requires Follow-up: [x] Yes  [] No    PLAN: Recommend Occupational Therapy 2 times a week for 6 weeks  [x] Continue per plan of care [] Alter current plan (see comments)  [] Plan of care initiated [] Hold pending MD visit [] Discharge    Plan for next session: ROM.  Pain mgt, modalities, progress HEP    Electronically signed by: Matt Dennison OTR/EZEKIEL [id number]

## 2019-05-03 ENCOUNTER — HOSPITAL ENCOUNTER (OUTPATIENT)
Dept: OCCUPATIONAL THERAPY | Age: 44
Setting detail: THERAPIES SERIES
Discharge: HOME | End: 2019-05-03
Payer: COMMERCIAL

## 2019-05-03 PROCEDURE — 97110 THERAPEUTIC EXERCISES: CPT | Performed by: OCCUPATIONAL THERAPIST

## 2019-05-03 PROCEDURE — 97035 APP MDLTY 1+ULTRASOUND EA 15: CPT | Performed by: OCCUPATIONAL THERAPIST

## 2019-05-03 PROCEDURE — 97140 MANUAL THERAPY 1/> REGIONS: CPT | Performed by: OCCUPATIONAL THERAPIST

## 2019-05-03 NOTE — FLOWSHEET NOTE
R:  L:       Forearm ROM  Sup/pron 67/82       Elbow ROM Ext/flex 5/80 WNL's after heat and stretch       Shoulder Flex  Shoulder Abd  Shoulder IR/ER WFL       Edema in cm circumf. MCPJs R:  L:       Edema in cm circumf.   Wrist R:  L:        strength in lbs NT  R: 65  L: 40 R: 100#  L: 63#   L: 75#   Pinch Strengthin lbs: lat  R:  L:       Pinch Strength in lbs:  3 point R:  L:          MMT:       tricep    R 4-/5  L 3+/5  Bicep   R: 4-/5  L: 3+/5              Modalities: 3/4/19   3/15/19 3/29/19  4/5/19  4/11/19  4/12/19  4/19/19  4/25/19  5/2/19  5/3/19    HP 10'   US x 8' cont  US x 8' cont  US x 8' US x 8' HP and IFC x 15' US x 8' cont lat elbow and tricep x 8' US cont triceps and lat elbow 8'   INF + HP  15' 15' 10' 10' 15'  US 10'  US x 8' xcont      Therapeutic Exercise & Activities:             Pt educ HEP-AROMprecautions            finisher  8' 8'  8' with 5# 6#       putty  Yellow rolling, shaping, kneading           Power  elbow tucked    Gentle forearm stretches with elbow slightly bent x 10 each     Corner stretch x 10   No money with no resistance x 15     SS x 15 #1 x 10 x 2       Flex bar red eccentric wrist x 15     S/p withh 1 wt x 15     Finisher 5# x 5'    Mid row red x 15 x 2        X 20 each         Same             Forearm stretching with elbow slightly bent x 10  #2 x 20 x 2      Flex bar eccentric wrist x 15 x 2     2 wts x 15 x 2       6# x 5'      Mid row green 15 x 2  #2 x 20 x 2       Red x 20         Same             Eccentric 2# wrist curls x 15 x 2  Same       Same                     Bicep curls 5# 10 x 2    Finisher 3# diagonals x 5'    Ulnar n glides x 5 (added for home)       Green flex bar x 20 each                         Finisher 7# diagonals x 5'    Ulnar n glide x 5     Bicep stretch on pole x 10     rebounder 4-6# x 5' chest pass  #3 x 20       Green x 20     Supine SASTM to triceps and triceps stretching 10'                Finisher 8# x 5'    Mid and low row green 15 x 2     Bicep curls 5# 15 x 2       Added above for home   t band bicep tricep orange band x 15 x 2    (added for home)  Green band mid row 15 x 2   t band green tricep 15 x 2                                                   Wall push up 15 x 2     Added for home   Wall push up 15 x 2                    Therapeutic Exercise and NMR EXR  [x] (42469) Provided verbal/tactile cueing for activities related to strengthening, flexibility, endurance, ROM  for improvements in scapular, scapulothoracic and UE control with self care, reaching, carrying, lifting, house/yardwork, driving/computer work.    [] (50455) Provided verbal/tactile cueing for activities related to improving balance, coordination, kinesthetic sense, posture, motor skill, proprioception  to assist with  scapular, scapulothoracic and UE control with self care, reaching, carrying, lifting, house/yardwork, driving/computer work. Therapeutic Activities:    [] ( 85197) therapeutic activities, direct (one on one) patient contact. Use of dynamic activities to improve functional performance.     Activities of Daily Living:  [] (32478) Provided self-care/home management training (i.e., activities of daily living and compensatory training, meal preparation, safety procedures, and instructions in use of assistive technology devices/adaptive equipment)     Home Exercise Program:  3/4/19: See media for AROM HEP  [x] (53408) Reviewed/Progressed HEP activities related to strengthening, flexibility, endurance, ROM of scapular, scapulothoracic and UE control with self care, reaching, carrying, lifting, house/yardwork, driving/computer work    Manual Treatments:  STM, scar mass and SASTM triceps  15'  [x] (01726) Provided manual therapy to mobilize soft tissue/joints of the UE for the purpose of modulating pain, promoting relaxation,  increasing ROM, reducing/eliminating soft tissue swelling/inflammation/restriction, improving soft tissue extensibility and allowing for proper ROM for normal function with self care, reaching, carrying, lifting, house/yardwork, driving/computer work    Splinting:  [] Fabrication of: L-code  [] (93965) Checkout for orthotic/prosthetic use, established patient   [] (54010) Orthotic management and training (fitting and assessment)  [] Comments:    Charges:  Timed Code Treatment Minutes: 53   Total Treatment Minutes: 53   [] EVAL (LOW) 17338   [] OT Re-eval (81492)  [] EVAL (MOD) 33990   [] EVAL (HIGH) 66096       [x] Chasity (31095) x  2   [] VUXSS(56207)  [] NMR (57463) x      [] Estim (attended) (13781)   [x] Manual ([id number]) x  1    [x] US (41809)  [] TA (29614) x      [] Paraffin (49152)  [] ADL  (36833) x     [] Splint/L code:    [] Estim (unattended) (38515)  [] Other:  [] Fluidotherapy (96110)  [](21376) Checkout for orthotic use, established patient x       [] (79026) Orthotic mgmt & training  x        GOALS:  Short Term Goals: To be achieved in: 2 weeks  1. Independent in HEP and progression per patient tolerance, in order to prevent re-injury. 2. Patient will have a decrease in pain to facilitate improvement in movement, function, and ADLs as indicated by Functional Deficits.     Long Term Goals to be achieved in 6  weeks, including patient directed goals to address identified performance deficits:  1) Pt to be independent in graded HEP progression with a good level of effort and compliance. MET  2) Pt to report a score of 25 % or less on the Quick DASH disability questionnaire for increased performance with carrying, moving, and handling objects. MET  3) Pt will demonstrate increased ROM to WFL for improved performance with self care tasks, home mgt tasks, recreational endeavors, carrying, and lifting. MET  4) Pt will demonstrate increased  strength to at least 50% of R hand for improved performance with self care tasks, home mgt tasks, recreational endeavors, carrying, and lifting.  MET  5) Pt will have a decrease in pain to 2/10 or less to facilitate improvement in performance with self care tasks, home mgt tasks, recreational endeavors, carrying, and lifting. NOT MET     New goal: Pt. Will increase L  strength to 85# for opening things and doing house/yard work. Set 5/2/19       Progression Towards Functional goals:  [x] Patient is progressing as expected towards functional goals listed. [] Progression is slowed due to complexities listed. [] Progression has been slowed due to co-morbidities. [] Plan just implemented, too soon to assess goals progression  [] Other:     ASSESSMENT:    Treatment/Activity Tolerance:  [x] Patient tolerated treatment well [] Patient limited by fatique  [] Patient limited by pain  [] Patient limited by other medical complications  [] Other:     Prognosis: [x] Good [] Fair  [] Poor    Patient Requires Follow-up: [x] Yes  [] No    PLAN: Recommend Occupational Therapy 2 times a week for 6 weeks  [x] Continue per plan of care [] Alter current plan (see comments)  [] Plan of care initiated [] Hold pending MD visit [] Discharge    Plan for next session: ROM.  Pain mgt, modalities, progress HEP    Electronically signed by: Debra Claire OTR/L [id number]

## 2019-05-10 ENCOUNTER — HOSPITAL ENCOUNTER (OUTPATIENT)
Dept: OCCUPATIONAL THERAPY | Age: 44
Setting detail: THERAPIES SERIES
Discharge: HOME | End: 2019-05-10
Payer: COMMERCIAL

## 2019-05-10 PROCEDURE — 97140 MANUAL THERAPY 1/> REGIONS: CPT | Performed by: OCCUPATIONAL THERAPIST

## 2019-05-10 PROCEDURE — 97035 APP MDLTY 1+ULTRASOUND EA 15: CPT | Performed by: OCCUPATIONAL THERAPIST

## 2019-05-10 NOTE — FLOWSHEET NOTE
[facility], [address] [place] [address] Rom  Phone: [phone]  Fax [phone]  Hand Therapy Daily Treatment Note  Patient: Cesar Clark        \"Salvador\"               : 1975                                  MRN: [id number]  Referring Physician: Referring Practitioner: Zeina Mckenzie                                                Evaluation Date: 3/4/2019                                           Medical Diagnosis Information:  Diagnosis: M77.12 (ICD-10-CM) - Lateral epicondylitis of left elbow;   Treatment Dx L elbow painM25. 522                      Date of injury:18  Date and type of Surgery:19                                   Insurance information:  BCBS  61 visits ; no co-pay    Comorbidities Affecting Functional Performance:     []Anxiety (F41.9)/Depression (F32.9)   []Diabetes Type 1(E10.65) or 2 (E11.65)   []Rheumatoid Arthritis (M05.9)  []Fibromyalgia (M79.7)  []Neuropathy(G60.9)  []Osteoarthritis(M19.91)  []None   []Other:    Date of Patient follow up with Physician: 3/12/19  Preferred Language for Healthcare:   [x]English       []other:  Latex Allergy:  [x]NO      []YES  RESTRICTIONS/PRECAUTIONS:    Progress Note: [x]  Yes  []  No  Next due by : Visit #10       Visit # Insurance Allowable Requires auth   12 60    no:[]                  yes:[]    From 3/4/19 to 5/10/2019    Pain level:  1-2/10 with use      SUBJECTIVE:   Cont soreness at triceps. Limited time for session today.     Postitive tinels over cubital tunnel today 19       Relevant Medical History/history of current problem: Pt reports injury likely from playing excessive golf        OBJECTIVE:   Date:  Hand Dominance:     [x]  Right    [] Left 3/4/2019    3/29/19  4/12/19  4/19/19  5/2/19    Objective Measures:         PAIN -7/10 4/10   1-2   Quick DASH 59%    18%   Digits tip to DPFC in cm WFL       Thumb ROM WFL       Thumb opposition  [facility] Wrist ROM Ext/Flex 45/70:       Rad/Uln dev ROM R:  L:       Forearm ROM  Sup/pron 67/82       Elbow ROM Ext/flex 5/80 WNL's after heat and stretch       Shoulder Flex  Shoulder Abd  Shoulder IR/ER WFL       Edema in cm circumf. MCPJs R:  L:       Edema in cm circumf.   Wrist R:  L:        strength in lbs NT  R: 65  L: 40 R: 100#  L: 63#   L: 75#   Pinch Strengthin lbs: lat  R:  L:       Pinch Strength in lbs:  3 point R:  L:          MMT:       tricep    R 4-/5  L 3+/5  Bicep   R: 4-/5  L: 3+/5              Modalities: 4/5/19  4/11/19  4/12/19  4/19/19  4/25/19  5/2/19  5/3/19  5/10/19   HP US x 8' cont  US x 8' cont  US x 8' US x 8' HP and IFC x 15' US x 8' cont lat elbow and tricep x 8' US cont triceps and lat elbow 8' 8'   INF + HP 10' 10' 15'  US 10'  US x 8' xcont       Therapeutic Exercise & Activities:           Pt educ           finisher  8' with 5# 6#        putty           Power  elbow tucked  #1 x 10 x 2       Flex bar red eccentric wrist x 15     S/p withh 1 wt x 15     Finisher 5# x 5'    Mid row red x 15 x 2        X 20 each         Same             Forearm stretching with elbow slightly bent x 10  #2 x 20 x 2      Flex bar eccentric wrist x 15 x 2     2 wts x 15 x 2       6# x 5'      Mid row green 15 x 2  #2 x 20 x 2       Red x 20         Same             Eccentric 2# wrist curls x 15 x 2  Same       Same                     Bicep curls 5# 10 x 2    Finisher 3# diagonals x 5'    Ulnar n glides x 5 (added for home)       Green flex bar x 20 each                         Finisher 7# diagonals x 5'    Ulnar n glide x 5     Bicep stretch on pole x 10     rebounder 4-6# x 5' chest pass  #3 x 20       Green x 20     Supine SASTM to triceps and triceps stretching 10'                Finisher 8# x 5'    Mid and low row green 15 x 2     Bicep curls 5# 15 x 2       t band bicep tricep orange band x 15 x 2    (added for home)  Green band mid row 15 x 2   t band green tricep 15 x 2 Wall push up 15 x 2     Added for home   Wall push up 15 x 2                   Therapeutic Exercise and NMR EXR  [x] (13546) Provided verbal/tactile cueing for activities related to strengthening, flexibility, endurance, ROM  for improvements in scapular, scapulothoracic and UE control with self care, reaching, carrying, lifting, house/yardwork, driving/computer work.    [] (40050) Provided verbal/tactile cueing for activities related to improving balance, coordination, kinesthetic sense, posture, motor skill, proprioception  to assist with  scapular, scapulothoracic and UE control with self care, reaching, carrying, lifting, house/yardwork, driving/computer work. Therapeutic Activities:    [] ( 09765) therapeutic activities, direct (one on one) patient contact. Use of dynamic activities to improve functional performance.     Activities of Daily Living:  [] (64582) Provided self-care/home management training (i.e., activities of daily living and compensatory training, meal preparation, safety procedures, and instructions in use of assistive technology devices/adaptive equipment)     Home Exercise Program:  3/4/19: See media for AROM HEP  [x] (07518) Reviewed/Progressed HEP activities related to strengthening, flexibility, endurance, ROM of scapular, scapulothoracic and UE control with self care, reaching, carrying, lifting, house/yardwork, driving/computer work    Manual Treatments:  STM, scar mass and SASTM triceps  15'  [x] (90121) Provided manual therapy to mobilize soft tissue/joints of the UE for the purpose of modulating pain, promoting relaxation,  increasing ROM, reducing/eliminating soft tissue swelling/inflammation/restriction, improving soft tissue extensibility and allowing for proper ROM for normal function with self care, reaching, carrying, lifting, house/yardwork, driving/computer work    Splinting:  [] Fabrication of: L-code  [] (62990) Checkout for orthotic/prosthetic use, established patient   [] (68908) Orthotic management and training (fitting and assessment)  [] Comments:    Charges:  Timed Code Treatment Minutes: 25'   Total Treatment Minutes: 25'   [] EVAL (LOW) [id number]   [] OT Re-eval (92952)  [] EVAL (MOD) 31931   [] EVAL (HIGH) [phone]       [] Chasity [phone]) x      [] LOVKG(55564)  [] NMR (93564) x      [] Estim (attended) (42954)   [x] Manual ([id number]) x  1    [x] US (09594)  [] TA (06651) x      [] Paraffin (10464)  [] ADL  ([phone]) x     [] Splint/L code:    [] Estim (unattended) (93840)  [] Other:  [] Fluidotherapy (29423)  [](97209) Checkout for orthotic use, established patient x       [] (50733) Orthotic mgmt & training  x        GOALS:  Short Term Goals: To be achieved in: 2 weeks  1. Independent in HEP and progression per patient tolerance, in order to prevent re-injury. 2. Patient will have a decrease in pain to facilitate improvement in movement, function, and ADLs as indicated by Functional Deficits.     Long Term Goals to be achieved in 6  weeks, including patient directed goals to address identified performance deficits:  1) Pt to be independent in graded HEP progression with a good level of effort and compliance. MET  2) Pt to report a score of 25 % or less on the Quick DASH disability questionnaire for increased performance with carrying, moving, and handling objects. MET  3) Pt will demonstrate increased ROM to WFL for improved performance with self care tasks, home mgt tasks, recreational endeavors, carrying, and lifting. MET  4) Pt will demonstrate increased  strength to at least 50% of R hand for improved performance with self care tasks, home mgt tasks, recreational endeavors, carrying, and lifting. MET  5) Pt will have a decrease in pain to 2/10 or less to facilitate improvement in performance with self care tasks, home mgt tasks, recreational endeavors, carrying, and lifting. NOT MET     New goal: Pt.  Will increase L  strength to 85# for opening

## 2019-05-15 ENCOUNTER — HOSPITAL ENCOUNTER (OUTPATIENT)
Dept: OCCUPATIONAL THERAPY | Age: 44
Setting detail: THERAPIES SERIES
Discharge: HOME | End: 2019-05-15
Payer: COMMERCIAL

## 2019-05-15 PROCEDURE — 97035 APP MDLTY 1+ULTRASOUND EA 15: CPT | Performed by: OCCUPATIONAL THERAPIST

## 2019-05-15 PROCEDURE — 97140 MANUAL THERAPY 1/> REGIONS: CPT | Performed by: OCCUPATIONAL THERAPIST

## 2019-05-15 PROCEDURE — 97110 THERAPEUTIC EXERCISES: CPT | Performed by: OCCUPATIONAL THERAPIST

## 2019-05-15 NOTE — FLOWSHEET NOTE
[facility], [address]  Phone: [phone]  Fax [phone]  Hand Therapy Daily Treatment Note  Patient: Buster Lora        \"[place]\"               : 1975                                  MRN: [id number]  Referring Physician: Referring Practitioner: Winifred Mancilla                                                Evaluation Date: 3/4/2019                                           Medical Diagnosis Information:  Diagnosis: M77.12 (ICD-10-CM) - Lateral epicondylitis of left elbow;   Treatment Dx L elbow painM25. 522                      Date of injury:18  Date and type of Surgery:19 s/p Lateral Tendon Repair                                   Insurance information:  BCBS  61 visits ; no co-pay    Comorbidities Affecting Functional Performance:     []Anxiety (F41.9)/Depression (F32.9)   []Diabetes Type 1(E10.65) or 2 (E11.65)   []Rheumatoid Arthritis (M05.9)  []Fibromyalgia (M79.7)  []Neuropathy(G60.9)  []Osteoarthritis(M19.91)  []None   []Other:    Date of Patient follow up with Physician: 3/12/19  Preferred Language for Healthcare:   [x]English       []other:  Latex Allergy:  [x]NO      []YES  RESTRICTIONS/PRECAUTIONS:    Progress Note: [x]  Yes  []  No  Next due by : Visit #10       Visit # Insurance Allowable Requires auth   13 60    no:[]                  yes:[]    From 3/4/19 to 5/15/2019    Pain level:  1-2/10 with use      SUBJECTIVE:  Reports using his arm for most daily activities is pain free, he does have pain if he has to lift anything that is heavy. He wants to try and play golf, and is 11 weeks post op tomorrow, told him he is ok to try and play before he comes back to therapy, if he agrees that he will take it slow, and stop if he has any pain. He will be playing with his 15 yr old daughter.      Postitive tinels over cubital tunnel today 19       Relevant Medical History/history of current problem: Pt reports injury likely from playing excessive golf        OBJECTIVE:   Date:  Hand Dominance:     [x]  Right    [] Left 3/4/2019    3/29/19  4/12/19  4/19/19  5/2/19  5/15/19    Objective Measures:          PAIN 4-7/10 4/10   1-2 0/10   Quick DASH 59%    18%    Digits tip to DPFC in cm WFL        Thumb ROM WFL        Thumb opposition  WFL        Wrist ROM Ext/Flex 45/70:        Rad/Uln dev ROM R:  L:        Forearm ROM  Sup/pron 67/82        Elbow ROM Ext/flex 5/80 WNL's after heat and stretch        Shoulder Flex  Shoulder Abd  Shoulder IR/ER WFL        Edema in cm circumf. MCPJs R:  L:        Edema in cm circumf.   Wrist R:  L:         strength in lbs NT  R: 65  L: 40 R: 100#  L: 63#   L: 75# 100  86   Pinch Strengthin lbs: lat  R:  L:        Pinch Strength in lbs:  3 point R:  L:           MMT:       tricep    R 4-/5  L 3+/5  Bicep   R: 4-/5  L: 3+/5               Modalities: 4/5/19  4/11/19  4/12/19  4/19/19  4/25/19  5/2/19  5/3/19  5/10/19 5/15/19    HP US x 8' cont  US x 8' cont  US x 8' US x 8' HP and IFC x 15' US x 8' cont lat elbow and tricep x 8' US cont triceps and lat elbow 8' 8' 8 min 1.4 w/cm2   INF + HP 10' 10' 15'  US 10'  US x 8' xcont        Therapeutic Exercise & Activities:            Pt educ            finisher  8' with 5# 6#         putty            Power  elbow tucked  #1 x 10 x 2       Flex bar red eccentric wrist x 15     S/p withh 1 wt x 15     Finisher 5# x 5'    Mid row red x 15 x 2        X 20 each         Same             Forearm stretching with elbow slightly bent x 10  #2 x 20 x 2      Flex bar eccentric wrist x 15 x 2     2 wts x 15 x 2       6# x 5'      Mid row green 15 x 2  #2 x 20 x 2       Red x 20         Same             Eccentric 2# wrist curls x 15 x 2  Same       Same                     Bicep curls 5# 10 x 2    Finisher 3# diagonals x 5'    Ulnar n glides x 5 (added for home)       Green flex bar x 20 each                         Finisher 7# diagonals x 5'    Ulnar n glide x 5     Bicep stretch on pole x 10     rebounder 4-6# x 5' chest pass  #3 x 20       Green x 20     Supine SASTM to triceps and triceps stretching 10'                Finisher 8# x 5'    Mid and low row green 15 x 2     Bicep curls 5# 15 x 2       Flex bar Green x20    Cupping to triceps    S/p 3 wts (sore at lateral elbow)    Power  #4-20x    Wrist extension with elbow in varying amount of extension 3# 30 total     t band bicep tricep orange band x 15 x 2    (added for home)  Green band mid row 15 x 2   t band green tricep 15 x 2                                               Wall push up 15 x 2     Added for home   Wall push up 15 x 2                     Therapeutic Exercise and NMR EXR  [x] (73265) Provided verbal/tactile cueing for activities related to strengthening, flexibility, endurance, ROM  for improvements in scapular, scapulothoracic and UE control with self care, reaching, carrying, lifting, house/yardwork, driving/computer work.    [] (63837) Provided verbal/tactile cueing for activities related to improving balance, coordination, kinesthetic sense, posture, motor skill, proprioception  to assist with  scapular, scapulothoracic and UE control with self care, reaching, carrying, lifting, house/yardwork, driving/computer work. Therapeutic Activities:    [] ( 30845) therapeutic activities, direct (one on one) patient contact. Use of dynamic activities to improve functional performance.     Activities of Daily Living:  [] (97268) Provided self-care/home management training (i.e., activities of daily living and compensatory training, meal preparation, safety procedures, and instructions in use of assistive technology devices/adaptive equipment)     Home Exercise Program:  3/4/19: See media for AROM HEP  [] (07569) Reviewed/Progressed HEP activities related to strengthening, flexibility, endurance, ROM of scapular, scapulothoracic and UE control with self care, reaching, will demonstrate increased ROM to WFL for improved performance with self care tasks, home mgt tasks, recreational endeavors, carrying, and lifting. MET  4) Pt will demonstrate increased  strength to at least 50% of R hand for improved performance with self care tasks, home mgt tasks, recreational endeavors, carrying, and lifting. MET  5) Pt will have a decrease in pain to 2/10 or less to facilitate improvement in performance with self care tasks, home mgt tasks, recreational endeavors, carrying, and lifting. NOT MET     New goal: Pt. Will increase L  strength to 85# for opening things and doing house/yard work. Set 5/2/19 - met 5/15/19       Progression Towards Functional goals:  [x] Patient is progressing as expected towards functional goals listed. [] Progression is slowed due to complexities listed. [] Progression has been slowed due to co-morbidities. [] Plan just implemented, too soon to assess goals progression  [] Other:     ASSESSMENT:  Better strength in , soreness only with resisted forearm rotation with weighted bar      Treatment/Activity Tolerance:  [x] Patient tolerated treatment well [] Patient limited by fatique  [] Patient limited by pain  [] Patient limited by other medical complications  [] Other:     Prognosis: [x] Good [] Fair  [] Poor    Patient Requires Follow-up: [x] Yes  [] No    PLAN: Recommend Occupational Therapy 2 times a week for 4 weeks  [x] Continue per plan of care [] Alter current plan (see comments)  [] Plan of care initiated [] Hold pending MD visit [] Discharge    Plan for next session: ROM.  Pain mgt, modalities, progress HEP    Electronically signed by: July MEHTA, T [id number]

## 2019-05-21 ENCOUNTER — HOSPITAL ENCOUNTER (OUTPATIENT)
Dept: OCCUPATIONAL THERAPY | Age: 44
Setting detail: THERAPIES SERIES
Discharge: HOME | End: 2019-05-21
Payer: COMMERCIAL

## 2019-05-21 PROCEDURE — 97140 MANUAL THERAPY 1/> REGIONS: CPT | Performed by: OCCUPATIONAL THERAPIST

## 2019-05-21 PROCEDURE — 97035 APP MDLTY 1+ULTRASOUND EA 15: CPT | Performed by: OCCUPATIONAL THERAPIST

## 2019-05-21 PROCEDURE — 97110 THERAPEUTIC EXERCISES: CPT | Performed by: OCCUPATIONAL THERAPIST

## 2019-05-21 NOTE — FLOWSHEET NOTE
[facility], [address]  Phone: [phone]  Fax [phone]  Hand Therapy Daily Treatment Note  Patient: Josemanuel Workman        \"Salvador\"               : 1975                                  MRN: [id number]  Referring Physician: Referring Practitioner: Rose Dempsey                                                Evaluation Date: 3/4/2019                                           Medical Diagnosis Information:  Diagnosis: M77.12 (ICD-10-CM) - Lateral epicondylitis of left elbow;   Treatment Dx L elbow painM25. 522                      Date of injury:18  Date and type of Surgery:19 s/p Lateral Tendon Repair                                   Insurance information:  BCBS  61 visits ; no co-pay    Comorbidities Affecting Functional Performance:     []Anxiety (F41.9)/Depression (F32.9)   []Diabetes Type 1(E10.65) or 2 (E11.65)   []Rheumatoid Arthritis (M05.9)  []Fibromyalgia (M79.7)  []Neuropathy(G60.9)  []Osteoarthritis(M19.91)  []None   []Other:    Date of Patient follow up with Physician: 3/12/19  Preferred Language for Healthcare:   [x]English       []other:  Latex Allergy:  [x]NO      []YES  RESTRICTIONS/PRECAUTIONS:    Progress Note: [x]  Yes  []  No  Next due by : Visit #10       Visit # Insurance Allowable Requires auth   14 60    no:[]                  yes:[]    From 3/4/19 to 2019    Pain level:  1-2/10 with use      SUBJECTIVE:   Discussed getting a script for DN with pt.  And mable     Postitive tinels over cubital tunnel today 19       Relevant Medical History/history of current problem: Pt reports injury likely from playing excessive golf        OBJECTIVE:   Date:  Hand Dominance:     [x]  Right    [] Left 3/4/2019    3/29/19  4/12/19  4/19/19  5/2/19  5/15/19    Objective Measures:          PAIN 4-7/10 410   1-2 0/10   Quick DASH 59%    18%    Digits tip to DPFC in cm [facility] Thumb ROM WFL        Thumb opposition  WFL        Wrist ROM Ext/Flex 45/70:        Rad/Uln dev ROM R:  L:        Forearm ROM  Sup/pron 67/82        Elbow ROM Ext/flex 5/80 WNL's after heat and stretch        Shoulder Flex  Shoulder Abd  Shoulder IR/ER WFL        Edema in cm circumf. MCPJs R:  L:        Edema in cm circumf.   Wrist R:  L:         strength in lbs NT  R: 65  L: 40 R: 100#  L: 63#   L: 75# 100  86   Pinch Strengthin lbs: lat  R:  L:        Pinch Strength in lbs:  3 point R:  L:           MMT:       tricep    R 4-/5  L 3+/5  Bicep   R: 4-/5  L: 3+/5               Modalities: 4/5/19  4/11/19  4/12/19  4/19/19  4/25/19  5/2/19  5/3/19  5/10/19 5/15/19  5/21/19    HP US x 8' cont  US x 8' cont  US x 8' US x 8' HP and IFC x 15' US x 8' cont lat elbow and tricep x 8' US cont triceps and lat elbow 8' 8' 8 min 1.4 w/cm2 US x 8' Cont   INF + HP 10' 10' 15'  US 10'  US x 8' xcont         Therapeutic Exercise & Activities:             Pt educ             finisher  8' with 5# 6#          putty             Power  elbow tucked  #1 x 10 x 2       Flex bar red eccentric wrist x 15     S/p withh 1 wt x 15     Finisher 5# x 5'    Mid row red x 15 x 2        X 20 each         Same             Forearm stretching with elbow slightly bent x 10  #2 x 20 x 2      Flex bar eccentric wrist x 15 x 2     2 wts x 15 x 2       6# x 5'      Mid row green 15 x 2  #2 x 20 x 2       Red x 20         Same             Eccentric 2# wrist curls x 15 x 2  Same       Same                     Bicep curls 5# 10 x 2    Finisher 3# diagonals x 5'    Ulnar n glides x 5 (added for home)       Green flex bar x 20 each                         Finisher 7# diagonals x 5'    Ulnar n glide x 5     Bicep stretch on pole x 10     rebounder 4-6# x 5' chest pass  #3 x 20       Green x 20     Supine SASTM to triceps and triceps stretching 10'                Finisher 8# x 5'    Mid and low row green 15 x 2     Bicep curls 5# 15 x 2       Flex bar Green x20    Cupping to triceps    S/p 3 wts (sore at lateral elbow)    Power  #4-20x    Wrist extension with elbow in varying amount of extension 3# 30 total     Flex bar green x 20     Cupping to triceps     S/p 2 wts 10 x 2    tricep stretch x 10       t band bicep tricep orange band x 15 x 2    (added for home)  Green band mid row 15 x 2   t band green tricep 15 x 2                                                   Wall push up 15 x 2     Added for home   Wall push up 15 x 2     Wall push up 15 x 2       rebounder 8# chest pass                   Therapeutic Exercise and NMR EXR  [x] (44594) Provided verbal/tactile cueing for activities related to strengthening, flexibility, endurance, ROM  for improvements in scapular, scapulothoracic and UE control with self care, reaching, carrying, lifting, house/yardwork, driving/computer work.    [] (70377) Provided verbal/tactile cueing for activities related to improving balance, coordination, kinesthetic sense, posture, motor skill, proprioception  to assist with  scapular, scapulothoracic and UE control with self care, reaching, carrying, lifting, house/yardwork, driving/computer work. Therapeutic Activities:    [] ( 64589) therapeutic activities, direct (one on one) patient contact. Use of dynamic activities to improve functional performance.     Activities of Daily Living:  [] (21116) Provided self-care/home management training (i.e., activities of daily living and compensatory training, meal preparation, safety procedures, and instructions in use of assistive technology devices/adaptive equipment)     Home Exercise Program:  3/4/19: See media for AROM HEP  [] (55522) Reviewed/Progressed HEP activities related to strengthening, flexibility, endurance, ROM of scapular, scapulothoracic and UE control with self care, reaching, carrying, lifting, house/yardwork, driving/computer work    Manual Treatments:  STM, scar mass and cupping  triceps    [x] (17721) Provided manual therapy to mobilize soft tissue/joints of the UE for the purpose of modulating pain, promoting relaxation,  increasing ROM, reducing/eliminating soft tissue swelling/inflammation/restriction, improving soft tissue extensibility and allowing for proper ROM for normal function with self care, reaching, carrying, lifting, house/yardwork, driving/computer work    Splinting:  [] Fabrication of: L-code  [] (73896) Checkout for orthotic/prosthetic use, established patient   [] (60082) Orthotic management and training (fitting and assessment)  [] Comments:    Charges:  Timed Code Treatment Minutes: 53   Total Treatment Minutes: 53   [] EVAL (LOW) 88228   [] OT Re-eval (42711)  [] EVAL (MOD) 28667   [] EVAL (HIGH) 09008       [x] Chasity (46924) x  2   [] [employer](16986)  [] NMR (07750) x      [] Estim (attended) (06642)   [x] Manual ([id number]) x  1    [x] US (47967)  [] TA (02870) x      [] Paraffin (19973)  [] ADL  (79090) x     [] Splint/L code:    [] Estim (unattended) (47136)  [] Other:  [] Fluidotherapy (56653)  [](90919) Checkout for orthotic use, established patient x       [] (20346) Orthotic mgmt & training  x        GOALS:  Short Term Goals: To be achieved in: 2 weeks  1. Independent in HEP and progression per patient tolerance, in order to prevent re-injury. 2. Patient will have a decrease in pain to facilitate improvement in movement, function, and ADLs as indicated by Functional Deficits.     Long Term Goals to be achieved in 6  weeks, including patient directed goals to address identified performance deficits:  1) Pt to be independent in graded HEP progression with a good level of effort and compliance. MET  2) Pt to report a score of 25 % or less on the Quick DASH disability questionnaire for increased performance with carrying, moving, and handling objects.  MET  3) Pt will demonstrate increased ROM to WFL for improved performance with self care tasks, home mgt tasks, recreational endeavors, carrying, and lifting. MET  4) Pt will demonstrate increased  strength to at least 50% of R hand for improved performance with self care tasks, home mgt tasks, recreational endeavors, carrying, and lifting. MET  5) Pt will have a decrease in pain to 2/10 or less to facilitate improvement in performance with self care tasks, home mgt tasks, recreational endeavors, carrying, and lifting. NOT MET     New goal: Pt. Will increase L  strength to 85# for opening things and doing house/yard work. Set 5/2/19 - met 5/15/19       Progression Towards Functional goals:  [x] Patient is progressing as expected towards functional goals listed. [] Progression is slowed due to complexities listed. [] Progression has been slowed due to co-morbidities. [] Plan just implemented, too soon to assess goals progression  [] Other:     ASSESSMENT:  Better strength in , soreness only with resisted forearm rotation with weighted bar      Treatment/Activity Tolerance:  [x] Patient tolerated treatment well [] Patient limited by fatique  [] Patient limited by pain  [] Patient limited by other medical complications  [] Other:     Prognosis: [x] Good [] Fair  [] Poor    Patient Requires Follow-up: [x] Yes  [] No    PLAN: Recommend Occupational Therapy 2 times a week for 4 weeks  [x] Continue per plan of care [] Alter current plan (see comments)  [] Plan of care initiated [] Hold pending MD visit [] Discharge    Plan for next session: ROM.  Pain mgt, modalities, progress HEP    Electronically signed by: Neeru Montes OTR/L [id number]

## 2019-05-22 DIAGNOSIS — M77.12 LATERAL EPICONDYLITIS OF LEFT ELBOW: Primary | ICD-10-CM

## 2019-05-24 ENCOUNTER — HOSPITAL ENCOUNTER (OUTPATIENT)
Dept: OCCUPATIONAL THERAPY | Age: 44
Setting detail: THERAPIES SERIES
Discharge: HOME | End: 2019-05-24
Payer: COMMERCIAL

## 2019-05-24 PROCEDURE — 97140 MANUAL THERAPY 1/> REGIONS: CPT | Performed by: OCCUPATIONAL THERAPIST

## 2019-05-24 PROCEDURE — 97110 THERAPEUTIC EXERCISES: CPT | Performed by: OCCUPATIONAL THERAPIST

## 2019-05-24 PROCEDURE — 97035 APP MDLTY 1+ULTRASOUND EA 15: CPT | Performed by: OCCUPATIONAL THERAPIST

## 2019-05-24 NOTE — FLOWSHEET NOTE
[facility], [address]  Phone: [phone]  Fax [phone]  Hand Therapy Daily Treatment Note  Patient: Josue Rios        \"Salvador\"               : 1975                                  MRN: [id number]  Referring Physician: Referring Practitioner: Rivera Mcconnell                                                Evaluation Date: 3/4/2019                                           Medical Diagnosis Information:  Diagnosis: M77.12 (ICD-10-CM) - Lateral epicondylitis of left elbow;   Treatment Dx L elbow painM25. 522                      Date of injury:18  Date and type of Surgery:19 s/p Lateral Tendon Repair                                   Insurance information:  BCBS  61 visits ; no co-pay    Comorbidities Affecting Functional Performance:     []Anxiety (F41.9)/Depression (F32.9)   []Diabetes Type 1(E10.65) or 2 (E11.65)   []Rheumatoid Arthritis (M05.9)  []Fibromyalgia (M79.7)  []Neuropathy(G60.9)  []Osteoarthritis(M19.91)  []None   []Other:    Date of Patient follow up with Physician: 3/12/19  Preferred Language for Healthcare:   [x]English       []other:  Latex Allergy:  [x]NO      []YES  RESTRICTIONS/PRECAUTIONS:    Progress Note: [x]  Yes  []  No  Next due by : Visit #10       Visit # Insurance Allowable Requires auth   15 60    no:[]                  yes:[]    From 3/4/19 to 2019    Pain level:  1-2/10 with use      SUBJECTIVE:  I have been sore over wrist extensors       Postitive tinels over cubital tunnel today 19       Relevant Medical History/history of current problem: Pt reports injury likely from playing excessive golf        OBJECTIVE:   Date:  Hand Dominance:     [x]  Right    [] Left 3/4/2019    3/29/19  4/12/19  4/19/19  5/2/19  5/15/19    Objective Measures:          PAIN 4-710 4/10   1-2 010   Quick DASH 59%    18%    Digits tip to DPFC in cm WFL        Thumb ROM [facility] Flex bar Green x20    Cupping to triceps    S/p 3 wts (sore at lateral elbow)    Power  #4-20x    Wrist extension with elbow in varying amount of extension 3# 30 total     Flex bar green x 20     Cupping to triceps     S/p 2 wts 10 x 2    tricep stretch x 10   Forearm stretching x 10     Flex bar green x 20           3 wts x 20       X 10      t band bicep tricep orange band x 15 x 2    (added for home)  Green band mid row 15 x 2   t band green tricep 15 x 2                                                       Wall push up 15 x 2     Added for home   Wall push up 15 x 2     Wall push up 15 x 2       rebounder 8# chest pass  Corner stretch x 10           Same                    Therapeutic Exercise and NMR EXR  [x] (01421) Provided verbal/tactile cueing for activities related to strengthening, flexibility, endurance, ROM  for improvements in scapular, scapulothoracic and UE control with self care, reaching, carrying, lifting, house/yardwork, driving/computer work.    [] (35428) Provided verbal/tactile cueing for activities related to improving balance, coordination, kinesthetic sense, posture, motor skill, proprioception  to assist with  scapular, scapulothoracic and UE control with self care, reaching, carrying, lifting, house/yardwork, driving/computer work. Therapeutic Activities:    [] ( 79170) therapeutic activities, direct (one on one) patient contact. Use of dynamic activities to improve functional performance.     Activities of Daily Living:  [] (39438) Provided self-care/home management training (i.e., activities of daily living and compensatory training, meal preparation, safety procedures, and instructions in use of assistive technology devices/adaptive equipment)     Home Exercise Program:  3/4/19: See media for AROM HEP  [] (84488) Reviewed/Progressed HEP activities related to strengthening, flexibility, endurance, ROM of scapular, scapulothoracic and UE control with self care, reaching, carrying, lifting, house/yardwork, driving/computer work    Manual Treatments:  STM, scar mass and DN see below  STM scar mass 7 min DN 7 min  [x] (37648) Provided manual therapy to mobilize soft tissue/joints of the UE for the purpose of modulating pain, promoting relaxation,  increasing ROM, reducing/eliminating soft tissue swelling/inflammation/restriction, improving soft tissue extensibility and allowing for proper ROM for normal function with self care, reaching, carrying, lifting, house/yardwork, driving/computer work      Spoke with Dr. Jason James regarding the use of Dry Needling     Dry needling manual therapy: consisted on the placement of 4 needles in the following muscles:  brachioradialis, all wrist extensors with threading technique and triceps long/short head. A 40/50 mm needle was inserted, piston, rotated, and coned to produce intramuscular mobilization. These techniques were used to restore functional range of motion, reduce muscle spasm and induce healing in the corresponding musculature. (08568)  Clean Technique was utilized today while applying Dry needling treatment. The treatment sites where cleaned with 70% solution of  isopropyl alcohol . The OT washed their hands and utilized treatment gloves along with hand  prior to inserting the needles. All needles where removed and discarded in the appropriate sharps container. MD has given verbal and/or written approval for this treatment.              Splinting:  [] Fabrication of: L-code  [] (67037) Checkout for orthotic/prosthetic use, established patient   [] (63506) Orthotic management and training (fitting and assessment)  [] Comments:    Charges:  Timed Code Treatment Minutes: 55   Total Treatment Minutes: 55   [] EVAL (LOW) 61398   [] OT Re-eval (90318)  [] EVAL (MOD) 88940   [] EVAL (HIGH) 55525       [x] Chasity (19581) x  2   [] WUDHP(36888)  [] NMR (93171) x      [] Estim (attended) (35914)   [x] Manual ([id number]) x  1    [x] US (22014)  [] TA (27248) x      [] Paraffin (48574)  [] ADL  (41939) x     [] Splint/L code:    [] Estim (unattended) (95010)  [] Other:  [] Fluidotherapy (56763)  [](15458) Checkout for orthotic use, established patient x       [] (40323) Orthotic mgmt & training  x        GOALS:  Short Term Goals: To be achieved in: 2 weeks  1. Independent in HEP and progression per patient tolerance, in order to prevent re-injury. 2. Patient will have a decrease in pain to facilitate improvement in movement, function, and ADLs as indicated by Functional Deficits.     Long Term Goals to be achieved in 6  weeks, including patient directed goals to address identified performance deficits:  1) Pt to be independent in graded HEP progression with a good level of effort and compliance. MET  2) Pt to report a score of 25 % or less on the Quick DASH disability questionnaire for increased performance with carrying, moving, and handling objects. MET  3) Pt will demonstrate increased ROM to WFL for improved performance with self care tasks, home mgt tasks, recreational endeavors, carrying, and lifting. MET  4) Pt will demonstrate increased  strength to at least 50% of R hand for improved performance with self care tasks, home mgt tasks, recreational endeavors, carrying, and lifting. MET  5) Pt will have a decrease in pain to 2/10 or less to facilitate improvement in performance with self care tasks, home mgt tasks, recreational endeavors, carrying, and lifting. NOT MET     New goal: Pt. Will increase L  strength to 85# for opening things and doing house/yard work. Set 5/2/19 - met 5/15/19       Progression Towards Functional goals:  [x] Patient is progressing as expected towards functional goals listed. [] Progression is slowed due to complexities listed. [] Progression has been slowed due to co-morbidities.   [] Plan just implemented, too soon to assess goals progression  [] Other:     ASSESSMENT:  Better strength in , soreness only with resisted forearm rotation with weighted bar      Treatment/Activity Tolerance:  [x] Patient tolerated treatment well [] Patient limited by fatique  [] Patient limited by pain  [] Patient limited by other medical complications  [] Other:     Prognosis: [x] Good [] Fair  [] Poor    Patient Requires Follow-up: [x] Yes  [] No    PLAN: Recommend Occupational Therapy 2 times a week for 4 weeks  [x] Continue per plan of care [] Alter current plan (see comments)  [] Plan of care initiated [] Hold pending MD visit [] Discharge    Plan for next session: ROM.  Pain mgt, modalities, progress HEP    Electronically signed by: Korina Carrington OTR/L [id number]

## 2019-05-28 ENCOUNTER — HOSPITAL ENCOUNTER (OUTPATIENT)
Dept: OCCUPATIONAL THERAPY | Age: 44
Setting detail: THERAPIES SERIES
Discharge: HOME | End: 2019-05-28
Payer: COMMERCIAL

## 2019-05-28 PROCEDURE — 97110 THERAPEUTIC EXERCISES: CPT | Performed by: OCCUPATIONAL THERAPIST

## 2019-05-28 PROCEDURE — 97035 APP MDLTY 1+ULTRASOUND EA 15: CPT | Performed by: OCCUPATIONAL THERAPIST

## 2019-05-28 PROCEDURE — 97140 MANUAL THERAPY 1/> REGIONS: CPT | Performed by: OCCUPATIONAL THERAPIST

## 2019-05-28 NOTE — FLOWSHEET NOTE
[facility], [address]  Phone: [phone]  Fax [phone]  Hand Therapy Daily Treatment Note  Patient: Loren Cheung        \"Salvador\"               : 1975                                  MRN: [id number]  Referring Physician: Referring Practitioner: Jone Flanagan                                                Evaluation Date: 3/4/2019                                           Medical Diagnosis Information:  Diagnosis: M77.12 (ICD-10-CM) - Lateral epicondylitis of left elbow;   Treatment Dx L elbow painM25. 522                      Date of injury:18  Date and type of Surgery:19 s/p Lateral Tendon Repair                                   Insurance information:  BCBS  61 visits ; no co-pay    Comorbidities Affecting Functional Performance:     []Anxiety (F41.9)/Depression (F32.9)   []Diabetes Type 1(E10.65) or 2 (E11.65)   []Rheumatoid Arthritis (M05.9)  []Fibromyalgia (M79.7)  []Neuropathy(G60.9)  []Osteoarthritis(M19.91)  []None   []Other:    Date of Patient follow up with Physician: 3/12/19  Preferred Language for Healthcare:   [x]English       []other:  Latex Allergy:  [x]NO      []YES  RESTRICTIONS/PRECAUTIONS:    Progress Note: [x]  Yes  []  No  Next due by : Visit #10       Visit # Insurance Allowable Requires auth   17 60    no:[]                  yes:[]    From 3/4/19 to 2019    Pain level:  1-2/10 with use      SUBJECTIVE:  Able to play golf with minimal soreness in triceps         Postitive tinels over cubital tunnel today 19       Relevant Medical History/history of current problem: Pt reports injury likely from playing excessive golf        OBJECTIVE:   Date:  Hand Dominance:     [x]  Right    [] Left 3/4/2019    3/29/19  4/12/19  4/19/19  5/2/19  5/15/19  5/28/19    Objective Measures:           PAIN 4-7/10 4/10   1-2 0/10    Quick DASH 59%    18%     Digits tip to DPFC in cm WFL         Thumb ROM WFL         Thumb opposition  WFL         Wrist ROM Ext/Flex 45/70:         Rad/Uln dev ROM R:  L:         Forearm ROM  Sup/pron 67/82         Elbow ROM Ext/flex 5/80 WNL's after heat and stretch         Shoulder Flex  Shoulder Abd  Shoulder IR/ER WFL         Edema in cm circumf. MCPJs R:  L:         Edema in cm circumf. Wrist R:  L:          strength in lbs NT  R: 65  L: 40 R: 100#  L: 63#   L: 75# 100  86   L: 80#    L: 84# after DN and manuals    Pinch Strengthin lbs: lat  R:  L:         Pinch Strength in lbs:  3 point R:  L:            MMT:       tricep    R 4-/5  L 3+/5  Bicep   R: 4-/5  L: 3+/5  tricep strength L: 3+/5               Modalities: 4/5/19  4/11/19  4/12/19  4/19/19  4/25/19  5/2/19  5/3/19  5/10/19 5/15/19  5/21/19  5/24/19  5/28/19    HP US x 8' cont  US x 8' cont  US x 8' US x 8' HP and IFC x 15' US x 8' cont lat elbow and tricep x 8' US cont triceps and lat elbow 8' 8' 8 min 1.4 w/cm2 US x 8' Cont US x 8' cont. US x 8' cont.     INF + HP 10' 10' 15'  US 10'  US x 8' xcont           Therapeutic Exercise & Activities:               Pt educ               finisher  8' with 5# 6#            putty               Power  elbow tucked  #1 x 10 x 2       Flex bar red eccentric wrist x 15     S/p withh 1 wt x 15     Finisher 5# x 5'    Mid row red x 15 x 2        X 20 each         Same             Forearm stretching with elbow slightly bent x 10  #2 x 20 x 2      Flex bar eccentric wrist x 15 x 2     2 wts x 15 x 2       6# x 5'      Mid row green 15 x 2  #2 x 20 x 2       Red x 20         Same             Eccentric 2# wrist curls x 15 x 2  Same       Same                     Bicep curls 5# 10 x 2    Finisher 3# diagonals x 5'    Ulnar n glides x 5 (added for home)       Green flex bar x 20 each                         Finisher 7# diagonals x 5'    Ulnar n glide x 5     Bicep stretch on pole x 10     rebounder 4-6# x 5' chest pass  #3 x 20       Green x 20     Supine SASTM to triceps and triceps stretching 10'                Finisher 8# x 5'    Mid and low row green 15 x 2     Bicep curls 5# 15 x 2       Flex bar Green x20    Cupping to triceps    S/p 3 wts (sore at lateral elbow)    Power  #4-20x    Wrist extension with elbow in varying amount of extension 3# 30 total     Flex bar green x 20     Cupping to triceps     S/p 2 wts 10 x 2    tricep stretch x 10   Forearm stretching x 10     Flex bar green x 20           3 wts x 20       X 10  Same       Green x 20               Triceps stretch x 10      t band bicep tricep orange band x 15 x 2    (added for home)  Green band mid row 15 x 2   t band green tricep 15 x 2       ER/IR and low row green band 15 x 2                                                     Wall push up 15 x 2     Added for home   Wall push up 15 x 2     Wall push up 15 x 2       rebounder 8# chest pass  Corner stretch x 10           Same            rebounder 8# x 5'                     Therapeutic Exercise and NMR EXR  [x] (97652) Provided verbal/tactile cueing for activities related to strengthening, flexibility, endurance, ROM  for improvements in scapular, scapulothoracic and UE control with self care, reaching, carrying, lifting, house/yardwork, driving/computer work.    [] (73004) Provided verbal/tactile cueing for activities related to improving balance, coordination, kinesthetic sense, posture, motor skill, proprioception  to assist with  scapular, scapulothoracic and UE control with self care, reaching, carrying, lifting, house/yardwork, driving/computer work. Therapeutic Activities:    [] ( 31022) therapeutic activities, direct (one on one) patient contact. Use of dynamic activities to improve functional performance.     Activities of Daily Living:  [] (47069) Provided self-care/home management training (i.e., activities of daily living and compensatory training, meal preparation, safety procedures, and instructions in use of assistive technology devices/adaptive equipment)     Home Exercise Program:  3/4/19: See media for AROM HEP  [] (17751) Reviewed/Progressed HEP activities related to strengthening, flexibility, endurance, ROM of scapular, scapulothoracic and UE control with self care, reaching, carrying, lifting, house/yardwork, driving/computer work    Manual Treatments:  STM, scar mass and DN see below  STM scar mass 7 min DN 7 min  [x] (31916) Provided manual therapy to mobilize soft tissue/joints of the UE for the purpose of modulating pain, promoting relaxation,  increasing ROM, reducing/eliminating soft tissue swelling/inflammation/restriction, improving soft tissue extensibility and allowing for proper ROM for normal function with self care, reaching, carrying, lifting, house/yardwork, driving/computer work      Spoke with Dr. Ashlie Burnette regarding the use of Dry Needling     Dry needling manual therapy: consisted on the placement of 4 needles in the following muscles:  brachioradialis, all wrist extensors with threading technique and triceps long/short head. A 40/50 mm needle was inserted, piston, rotated, and coned to produce intramuscular mobilization. These techniques were used to restore functional range of motion, reduce muscle spasm and induce healing in the corresponding musculature. (55704)  Clean Technique was utilized today while applying Dry needling treatment. The treatment sites where cleaned with 70% solution of  isopropyl alcohol . The OT washed their hands and utilized treatment gloves along with hand  prior to inserting the needles. All needles where removed and discarded in the appropriate sharps container. MD has given verbal and/or written approval for this treatment.              Splinting:  [] Fabrication of: L-code  [] (79957) Checkout for orthotic/prosthetic use, established patient   [] (43055) Orthotic management and training (fitting and assessment)  [] Comments:    Charges:  Timed Code Treatment Minutes: 53   Total Treatment Minutes: 53   [] EVAL (LOW) 18432   [] OT Re-eval (73719)  [] EVAL (MOD) 33507   [] EVAL (HIGH) 29840       [x] Chasity (45423) x  2   [] IQXMW(90902)  [] NMR (71383) x      [] Estim (attended) (89708)   [x] Manual (29825) x  1    [x] US (33896)  [] TA (83947) x      [] Paraffin (93862)  [] ADL  (94718) x     [] Splint/L code:    [] Estim (unattended) (99569)  [] Other:  [] Fluidotherapy (76775)  [](54045) Checkout for orthotic use, established patient x       [] (28610) Orthotic mgmt & training  x        GOALS:  Short Term Goals: To be achieved in: 2 weeks  1. Independent in HEP and progression per patient tolerance, in order to prevent re-injury. 2. Patient will have a decrease in pain to facilitate improvement in movement, function, and ADLs as indicated by Functional Deficits.     Long Term Goals to be achieved in 6  weeks, including patient directed goals to address identified performance deficits:  1) Pt to be independent in graded HEP progression with a good level of effort and compliance. MET  2) Pt to report a score of 25 % or less on the Quick DASH disability questionnaire for increased performance with carrying, moving, and handling objects. MET  3) Pt will demonstrate increased ROM to WFL for improved performance with self care tasks, home mgt tasks, recreational endeavors, carrying, and lifting. MET  4) Pt will demonstrate increased  strength to at least 50% of R hand for improved performance with self care tasks, home mgt tasks, recreational endeavors, carrying, and lifting. MET  5) Pt will have a decrease in pain to 2/10 or less to facilitate improvement in performance with self care tasks, home mgt tasks, recreational endeavors, carrying, and lifting. NOT MET     New goal: Pt. Will increase L  strength to 85# for opening things and doing house/yard work.    Set 5/2/19 - met 5/15/19       Progression Towards Functional goals:  [x] Patient is progressing as expected

## 2019-05-31 ENCOUNTER — HOSPITAL ENCOUNTER (OUTPATIENT)
Dept: OCCUPATIONAL THERAPY | Age: 44
Setting detail: THERAPIES SERIES
Discharge: HOME | End: 2019-05-31
Payer: COMMERCIAL

## 2019-05-31 PROCEDURE — 97035 APP MDLTY 1+ULTRASOUND EA 15: CPT | Performed by: OCCUPATIONAL THERAPIST

## 2019-05-31 PROCEDURE — 97110 THERAPEUTIC EXERCISES: CPT | Performed by: OCCUPATIONAL THERAPIST

## 2019-05-31 PROCEDURE — 97140 MANUAL THERAPY 1/> REGIONS: CPT | Performed by: OCCUPATIONAL THERAPIST

## 2019-05-31 NOTE — FLOWSHEET NOTE
[facility], [address]  Phone: [phone]  Fax [phone]  Hand Therapy Daily Treatment Note  Patient: Jake Mayo        \"Salvador\"               : 1975                                  MRN: [id number]  Referring Physician: Referring Practitioner: Cyn Barros                                                Evaluation Date: 3/4/2019                                           Medical Diagnosis Information:  Diagnosis: M77.12 (ICD-10-CM) - Lateral epicondylitis of left elbow;   Treatment Dx L elbow painM25. 522                      Date of injury:18  Date and type of Surgery:19 s/p Lateral Tendon Repair                                   Insurance information:  BCBS  61 visits ; no co-pay    Comorbidities Affecting Functional Performance:     []Anxiety (F41.9)/Depression (F32.9)   []Diabetes Type 1(E10.65) or 2 (E11.65)   []Rheumatoid Arthritis (M05.9)  []Fibromyalgia (M79.7)  []Neuropathy(G60.9)  []Osteoarthritis(M19.91)  []None   []Other:    Date of Patient follow up with Physician: 3/12/19  Preferred Language for Healthcare:   [x]English       []other:  Latex Allergy:  [x]NO      []YES  RESTRICTIONS/PRECAUTIONS:    Progress Note: [x]  Yes  []  No  Next due by : Visit #10       Visit # Insurance Allowable Requires auth   18 60    no:[]                  yes:[]    From 3/4/19 to 2019    Pain level:  1-2/10 with use      SUBJECTIVE:  The arm gets fatigued after resistive use         Postitive tinels over cubital tunnel today 19       Relevant Medical History/history of current problem: Pt reports injury likely from playing excessive golf        OBJECTIVE:   Date:  Hand Dominance:     [x]  Right    [] Left 3/4/2019    3/29/19  4/12/19  4/19/19  5/2/19  5/15/19  5/28/19  5/31/19    Objective Measures:            PAIN 4-7/10 4/10   1-2 0/10     Quick DASH 59%    18%      Digits tip to DPFC in cm WFL          Thumb ROM WFL          Thumb opposition  WFL          Wrist ROM Ext/Flex 45/70:          Rad/Uln dev ROM R:  L:          Forearm ROM  Sup/pron 67/82          Elbow ROM Ext/flex 5/80 WNL's after heat and stretch          Shoulder Flex  Shoulder Abd  Shoulder IR/ER WFL          Edema in cm circumf. MCPJs R:  L:          Edema in cm circumf. Wrist R:  L:           strength in lbs NT  R: 65  L: 40 R: 100#  L: 63#   L: 75# 100  86   L: 80#    L: 84# after DN and manuals    L: 90#   Pinch Strengthin lbs: lat  R:  L:          Pinch Strength in lbs:  3 point R:  L:             MMT:       tricep    R 4-/5  L 3+/5  Bicep   R: 4-/5  L: 3+/5  tricep strength L: 3+/5                Modalities: 4/5/19  4/11/19  4/12/19  4/19/19  4/25/19  5/2/19  5/3/19  5/10/19 5/15/19  5/21/19  5/24/19  5/28/19  5/31/19    HP US x 8' cont  US x 8' cont  US x 8' US x 8' HP and IFC x 15' US x 8' cont lat elbow and tricep x 8' US cont triceps and lat elbow 8' 8' 8 min 1.4 w/cm2 US x 8' Cont US x 8' cont. US x 8' cont.   US x 8' cont    INF + HP 10' 10' 15'  US 10'  US x 8' xcont            Therapeutic Exercise & Activities:                Pt educ                finisher  8' with 5# 6#             putty                Power  elbow tucked  #1 x 10 x 2       Flex bar red eccentric wrist x 15     S/p withh 1 wt x 15     Finisher 5# x 5'    Mid row red x 15 x 2        X 20 each         Same             Forearm stretching with elbow slightly bent x 10  #2 x 20 x 2      Flex bar eccentric wrist x 15 x 2     2 wts x 15 x 2       6# x 5'      Mid row green 15 x 2  #2 x 20 x 2       Red x 20         Same             Eccentric 2# wrist curls x 15 x 2  Same       Same                     Bicep curls 5# 10 x 2    Finisher 3# diagonals x 5'    Ulnar n glides x 5 (added for home)       Green flex bar x 20 each                         Finisher 7# diagonals x 5'    Ulnar n glide x 5     Bicep stretch on pole x 10     rebounder 4-6# x 5' chest pass  #3 x 20       Green x 20     Supine SASTM to triceps and triceps stretching 10'                Finisher 8# x 5'    Mid and low row green 15 x 2     Bicep curls 5# 15 x 2       Flex bar Green x20    Cupping to triceps    S/p 3 wts (sore at lateral elbow)    Power  #4-20x    Wrist extension with elbow in varying amount of extension 3# 30 total     Flex bar green x 20     Cupping to triceps     S/p 2 wts 10 x 2    tricep stretch x 10   Forearm stretching x 10     Flex bar green x 20           3 wts x 20       X 10  Same       Green x 20               Triceps stretch x 10  Same       Green x 20                 Toss yellow ball palm down x 20     S/p 3 wts x 20     tricep pulls with black 15 x 2     Wrist curls 3# 15 x 2                    t band bicep tricep orange band x 15 x 2    (added for home)  Green band mid row 15 x 2   t band green tricep 15 x 2       ER/IR and low row green band 15 x 2                                                         Wall push up 15 x 2     Added for home   Wall push up 15 x 2     Wall push up 15 x 2       rebounder 8# chest pass  Corner stretch x 10           Same            rebounder 8# x 5'            rebounder 8# x 5'                      Therapeutic Exercise and NMR EXR  [x] (30777) Provided verbal/tactile cueing for activities related to strengthening, flexibility, endurance, ROM  for improvements in scapular, scapulothoracic and UE control with self care, reaching, carrying, lifting, house/yardwork, driving/computer work.    [] (08268) Provided verbal/tactile cueing for activities related to improving balance, coordination, kinesthetic sense, posture, motor skill, proprioception  to assist with  scapular, scapulothoracic and UE control with self care, reaching, carrying, lifting, house/yardwork, driving/computer work. Therapeutic Activities:    [] ( 68043) therapeutic activities, direct (one on one) patient contact.  Use of dynamic activities to improve functional performance. Activities of Daily Living:  [] (80693) Provided self-care/home management training (i.e., activities of daily living and compensatory training, meal preparation, safety procedures, and instructions in use of assistive technology devices/adaptive equipment)     Home Exercise Program:  3/4/19: See media for AROM HEP  [] (26607) Reviewed/Progressed HEP activities related to strengthening, flexibility, endurance, ROM of scapular, scapulothoracic and UE control with self care, reaching, carrying, lifting, house/yardwork, driving/computer work    Manual Treatments:  STM, scar mass 10'  [x] (37087) Provided manual therapy to mobilize soft tissue/joints of the UE for the purpose of modulating pain, promoting relaxation,  increasing ROM, reducing/eliminating soft tissue swelling/inflammation/restriction, improving soft tissue extensibility and allowing for proper ROM for normal function with self care, reaching, carrying, lifting, house/yardwork, driving/computer work                  Splinting:  [] Fabrication of: L-code  [] (87490) Checkout for orthotic/prosthetic use, established patient   [] (31300) Orthotic management and training (fitting and assessment)  [] Comments:    Charges:  Timed Code Treatment Minutes: 55   Total Treatment Minutes: 55   [] EVAL (LOW) 32723   [] OT Re-eval (10720)  [] EVAL (MOD) 65581   [] EVAL (HIGH) 15867       [x] Chasity (78366) x  2   [] PTVRS(94906)  [] NMR (70869) x      [] Estim (attended) (21813)   [x] Manual ([id number]) x  1    [x] US (35342)  [] TA (06421) x      [] Paraffin (44291)  [] ADL  ([phone]) x     [] Splint/L code:    [] Estim (unattended) (62799)  [] Other:  [] Fluidotherapy (04050)  [](50498) Checkout for orthotic use, established patient x       [] (55999) Orthotic mgmt & training  x        GOALS:  Short Term Goals: To be achieved in: 2 weeks  1. Independent in HEP and progression per patient tolerance, in order to prevent re-injury.    2. Patient will have a decrease in pain to facilitate improvement in movement, function, and ADLs as indicated by Functional Deficits.     Long Term Goals to be achieved in 6  weeks, including patient directed goals to address identified performance deficits:  1) Pt to be independent in graded HEP progression with a good level of effort and compliance. MET  2) Pt to report a score of 25 % or less on the Quick DASH disability questionnaire for increased performance with carrying, moving, and handling objects. MET  3) Pt will demonstrate increased ROM to WFL for improved performance with self care tasks, home mgt tasks, recreational endeavors, carrying, and lifting. MET  4) Pt will demonstrate increased  strength to at least 50% of R hand for improved performance with self care tasks, home mgt tasks, recreational endeavors, carrying, and lifting. MET  5) Pt will have a decrease in pain to 2/10 or less to facilitate improvement in performance with self care tasks, home mgt tasks, recreational endeavors, carrying, and lifting. NOT MET     New goal: Pt. Will increase L  strength to 85# for opening things and doing house/yard work. Set 5/2/19 - met 5/15/19       New goal: Increase L tricep strength 1/2 grade for yardwork tasks. Set 5/31/19       Progression Towards Functional goals:  [x] Patient is progressing as expected towards functional goals listed. [] Progression is slowed due to complexities listed. [] Progression has been slowed due to co-morbidities.   [] Plan just implemented, too soon to assess goals progression  [] Other:     ASSESSMENT:  Strength is progressing       Treatment/Activity Tolerance:  [x] Patient tolerated treatment well [] Patient limited by fatique  [] Patient limited by pain  [] Patient limited by other medical complications  [] Other:     Prognosis: [x] Good [] Fair  [] Poor    Patient Requires Follow-up: [x] Yes  [] No    PLAN: Recommend Occupational Therapy 2 times a week for 4 weeks  [x] Continue per plan of care [] Alter current plan (see comments)  [] Plan of care initiated [] Hold pending MD visit [] Discharge    Plan for next session: ROM.  Pain mgt, modalities, progress HEP    Electronically signed by: Cash Robledo OTR/L [id number]

## 2019-06-04 ENCOUNTER — HOSPITAL ENCOUNTER (OUTPATIENT)
Dept: OCCUPATIONAL THERAPY | Age: 44
Setting detail: THERAPIES SERIES
Discharge: HOME | End: 2019-06-04
Payer: COMMERCIAL

## 2019-06-04 PROCEDURE — 97140 MANUAL THERAPY 1/> REGIONS: CPT | Performed by: OCCUPATIONAL THERAPIST

## 2019-06-04 PROCEDURE — 97035 APP MDLTY 1+ULTRASOUND EA 15: CPT | Performed by: OCCUPATIONAL THERAPIST

## 2019-06-04 PROCEDURE — 97110 THERAPEUTIC EXERCISES: CPT | Performed by: OCCUPATIONAL THERAPIST

## 2019-06-04 NOTE — FLOWSHEET NOTE
[facility], [address] [place] [address] Rom  Phone: [phone]  Fax [phone]  Hand Therapy Daily Treatment Note  Patient: Alie Watson        \"Salvador\"               : 1975                                  MRN: [id number]  Referring Physician: Referring Practitioner: Alethea Pollock                                                Evaluation Date: 3/4/2019                                           Medical Diagnosis Information:  Diagnosis: M77.12 (ICD-10-CM) - Lateral epicondylitis of left elbow;   Treatment Dx L elbow painM25. 522                      Date of injury:18  Date and type of Surgery:19 s/p Lateral Tendon Repair                                   Insurance information:  [employer]  61 visits ; no co-pay    Comorbidities Affecting Functional Performance:     []Anxiety (F41.9)/Depression (F32.9)   []Diabetes Type 1(E10.65) or 2 (E11.65)   []Rheumatoid Arthritis (M05.9)  []Fibromyalgia (M79.7)  []Neuropathy(G60.9)  []Osteoarthritis(M19.91)  []None   []Other:    Date of Patient follow up with Physician: 3/12/19  Preferred Language for Healthcare:   [x]English       []other:  Latex Allergy:  [x]NO      []YES  RESTRICTIONS/PRECAUTIONS:    Progress Note: [x]  Yes  []  No  Next due by : Visit #10       Visit # Insurance Allowable Requires auth   19 60    no:[]                  yes:[]    From 3/4/19 to 2019    Pain level:  1-2/10 with use      SUBJECTIVE:  Soreness from doing yardwork        Postitive tinels over cubital tunnel today 19       Relevant Medical History/history of current problem: Pt reports injury likely from playing excessive golf        OBJECTIVE:   Date:  Hand Dominance:     [x]  Right    [] Left 3/4/2019    3/29/19  4/12/19  4/19/19  5/2/19  5/15/19  5/28/19  5/31/19  6/4/19    Objective Measures:             PAIN 4-7/10 4/10   1-2 0/10      Quick DASH 59%    18%       Digits tip to DPFC in cm WFL           Thumb ROM WFL           Thumb opposition  WFL           Wrist ROM Ext/Flex 45/70:           Rad/Uln dev ROM R:  L:           Forearm ROM  Sup/pron 67/82           Elbow ROM Ext/flex 5/80 WNL's after heat and stretch           Shoulder Flex  Shoulder Abd  Shoulder IR/ER WFL           Edema in cm circumf. MCPJs R:  L:           Edema in cm circumf. Wrist R:  L:            strength in lbs NT  R: 65  L: 40 R: 100#  L: 63#   L: 75# 100  86   L: 80#    L: 84# after DN and manuals    L: 90#   L: 80#   Pinch Strengthin lbs: lat  R:  L:           Pinch Strength in lbs:  3 point R:  L:              MMT:       tricep    R 4-/5  L 3+/5  Bicep   R: 4-/5  L: 3+/5  tricep strength L: 3+/5                 Modalities: 4/5/19  4/11/19  4/12/19  4/19/19  4/25/19  5/2/19  5/3/19  5/10/19 5/15/19  5/21/19  5/24/19  5/28/19  5/31/19  6/4/19    HP US x 8' cont  US x 8' cont  US x 8' US x 8' HP and IFC x 15' US x 8' cont lat elbow and tricep x 8' US cont triceps and lat elbow 8' 8' 8 min 1.4 w/cm2 US x 8' Cont US x 8' cont. US x 8' cont. US x 8' cont  US x 8' cont.    INF + HP 10' 10' 15'  US 10'  US x 8' xcont             Therapeutic Exercise & Activities:                 Pt educ                 finisher  8' with 5# 6#              putty                 Power  elbow tucked  #1 x 10 x 2       Flex bar red eccentric wrist x 15     S/p withh 1 wt x 15     Finisher 5# x 5'    Mid row red x 15 x 2        X 20 each         Same             Forearm stretching with elbow slightly bent x 10  #2 x 20 x 2      Flex bar eccentric wrist x 15 x 2     2 wts x 15 x 2       6# x 5'      Mid row green 15 x 2  #2 x 20 x 2       Red x 20         Same             Eccentric 2# wrist curls x 15 x 2  Same       Same                     Bicep curls 5# 10 x 2    Finisher 3# diagonals x 5'    Ulnar n glides x 5 (added for home)       Green flex bar x 20 each                         Finisher 7# diagonals x 5'    Ulnar n glide x 5     Bicep stretch on pole x 10     rebounder 4-6# x 5' chest pass  #3 x 20       Green x 20     Supine SASTM to triceps and triceps stretching 10'                Finisher 8# x 5'    Mid and low row green 15 x 2     Bicep curls 5# 15 x 2       Flex bar Green x20    Cupping to triceps    S/p 3 wts (sore at lateral elbow)    Power  #4-20x    Wrist extension with elbow in varying amount of extension 3# 30 total     Flex bar green x 20     Cupping to triceps     S/p 2 wts 10 x 2    tricep stretch x 10   Forearm stretching x 10     Flex bar green x 20           3 wts x 20       X 10  Same       Green x 20               Triceps stretch x 10  Same       Green x 20                 Toss yellow ball palm down x 20     S/p 3 wts x 20     tricep pulls with black 15 x 2     Wrist curls 3# 15 x 2                Same       Green x 20                       S/p 3 wts x 20           Wrist curls 3# 15 x 2      t band bicep tricep orange band x 15 x 2    (added for home)  Green band mid row 15 x 2   t band green tricep 15 x 2       ER/IR and low row green band 15 x 2                                                             Wall push up 15 x 2     Added for home   Wall push up 15 x 2     Wall push up 15 x 2       rebounder 8# chest pass  Corner stretch x 10           Same            rebounder 8# x 5'            rebounder 8# x 5'  Corner stretch x 10           rebounder 8# x 5'                      Therapeutic Exercise and NMR EXR  [x] (51811) Provided verbal/tactile cueing for activities related to strengthening, flexibility, endurance, ROM  for improvements in scapular, scapulothoracic and UE control with self care, reaching, carrying, lifting, house/yardwork, driving/computer work.    [] (93670) Provided verbal/tactile cueing for activities related to improving balance, coordination, kinesthetic sense, posture, motor skill, proprioception  to assist with  scapular, scapulothoracic and UE control with self care, reaching, carrying, lifting, house/yardwork, driving/computer work. Therapeutic Activities:    [] ( 04149) therapeutic activities, direct (one on one) patient contact. Use of dynamic activities to improve functional performance.     Activities of Daily Living:  [] (11492) Provided self-care/home management training (i.e., activities of daily living and compensatory training, meal preparation, safety procedures, and instructions in use of assistive technology devices/adaptive equipment)     Home Exercise Program:  3/4/19: See media for AROM HEP  [] (49467) Reviewed/Progressed HEP activities related to strengthening, flexibility, endurance, ROM of scapular, scapulothoracic and UE control with self care, reaching, carrying, lifting, house/yardwork, driving/computer work    Manual Treatments:  STM, scar mass 10'  [x] (77342) Provided manual therapy to mobilize soft tissue/joints of the UE for the purpose of modulating pain, promoting relaxation,  increasing ROM, reducing/eliminating soft tissue swelling/inflammation/restriction, improving soft tissue extensibility and allowing for proper ROM for normal function with self care, reaching, carrying, lifting, house/yardwork, driving/computer work                  Splinting:  [] Fabrication of: L-code  [] (03696) Checkout for orthotic/prosthetic use, established patient   [] (84927) Orthotic management and training (fitting and assessment)  [] Comments:    Charges:  Timed Code Treatment Minutes: 40   Total Treatment Minutes: 40   [] EVAL (LOW) 81962   [] OT Re-eval (73669)  [] EVAL (MOD) 30598   [] EVAL (HIGH) 93310       [x] Chasity (42373) x  1   [] BVCKW(87758)  [] NMR (74867) x      [] Estim (attended) (14557)   [x] Manual ([id number]) x  1    [x] US (37382)  [] TA (43038) x      [] Paraffin (54200)  [] ADL  ([phone] [id number]) x     [] Splint/L code:    [] Estim (unattended) (10870)  [] Other:  [] Fluidotherapy (25675)  [](54095) Checkout for orthotic use, established patient x       [] (15096) Orthotic mgmt & training  x        GOALS:  Short Term Goals: To be achieved in: 2 weeks  1. Independent in HEP and progression per patient tolerance, in order to prevent re-injury. 2. Patient will have a decrease in pain to facilitate improvement in movement, function, and ADLs as indicated by Functional Deficits.     Long Term Goals to be achieved in 6  weeks, including patient directed goals to address identified performance deficits:  1) Pt to be independent in graded HEP progression with a good level of effort and compliance. MET  2) Pt to report a score of 25 % or less on the Quick DASH disability questionnaire for increased performance with carrying, moving, and handling objects. MET  3) Pt will demonstrate increased ROM to WFL for improved performance with self care tasks, home mgt tasks, recreational endeavors, carrying, and lifting. MET  4) Pt will demonstrate increased  strength to at least 50% of R hand for improved performance with self care tasks, home mgt tasks, recreational endeavors, carrying, and lifting. MET  5) Pt will have a decrease in pain to 2/10 or less to facilitate improvement in performance with self care tasks, home mgt tasks, recreational endeavors, carrying, and lifting. NOT MET     New goal: Pt. Will increase L  strength to 85# for opening things and doing house/yard work. Set 5/2/19 - met 5/15/19       New goal: Increase L tricep strength 1/2 grade for yardwork tasks. Set 5/31/19       Progression Towards Functional goals:  [x] Patient is progressing as expected towards functional goals listed. [] Progression is slowed due to complexities listed. [] Progression has been slowed due to co-morbidities.   [] Plan just implemented, too soon to assess goals progression  [] Other:     ASSESSMENT:  Strength is progressing       Treatment/Activity Tolerance:  [x] Patient tolerated treatment well [] Patient limited by fatique  [] Patient limited by pain  [] Patient limited by other medical complications  [] Other:     Prognosis: [x] Good [] Fair  [] Poor    Patient Requires Follow-up: [x] Yes  [] No    PLAN: Recommend Occupational Therapy 2 times a week for 4 weeks  [x] Continue per plan of care [] Alter current plan (see comments)  [] Plan of care initiated [] Hold pending MD visit [] Discharge    Plan for next session: ROM.  Pain mgt, modalities, progress HEP    Electronically signed by: Ismael Mullins OTR/L [id number]

## 2019-06-14 ENCOUNTER — HOSPITAL ENCOUNTER (OUTPATIENT)
Dept: OCCUPATIONAL THERAPY | Age: 44
Setting detail: THERAPIES SERIES
Discharge: HOME | End: 2019-06-14
Payer: COMMERCIAL

## 2019-06-14 PROCEDURE — 97110 THERAPEUTIC EXERCISES: CPT | Performed by: OCCUPATIONAL THERAPIST

## 2019-06-14 PROCEDURE — 97140 MANUAL THERAPY 1/> REGIONS: CPT | Performed by: OCCUPATIONAL THERAPIST

## 2019-06-14 NOTE — FLOWSHEET NOTE
[facility], [address] [place]  Phone: [phone]  Fax [phone]  Hand Therapy Daily Treatment Note  Patient: Cesar Clark        \"Salvador\"               : 1975                                  MRN: [id number]  Referring Physician: Referring Practitioner: Zeina Mckenzie                                                Evaluation Date: 3/4/2019                                           Medical Diagnosis Information:  Diagnosis: M77.12 (ICD-10-CM) - Lateral epicondylitis of left elbow;   Treatment Dx L elbow painM25. 522                      Date of injury:18  Date and type of Surgery:19 s/p Lateral Tendon Repair                                   Insurance information:  [facility]  61 visits ; no co-pay    Comorbidities Affecting Functional Performance:     []Anxiety (F41.9)/Depression (F32.9)   []Diabetes Type 1(E10.65) or 2 (E11.65)   []Rheumatoid Arthritis (M05.9)  []Fibromyalgia (M79.7)  []Neuropathy(G60.9)  []Osteoarthritis(M19.91)  []None   []Other:    Date of Patient follow up with Physician: 3/12/19  Preferred Language for Healthcare:   [x]English       []other:  Latex Allergy:  [x]NO      []YES  RESTRICTIONS/PRECAUTIONS:    Progress Note: [x]  Yes  []  No  Next due by : Visit #10       Visit # Insurance Allowable Requires auth   20 60    no:[]                  yes:[]    From 3/4/19 to 2019    Pain level:  1-2/10 with use      Subjective  I was on vacation last week but I did my exercises.        Postitive tinels over cubital tunnel today 19       Relevant Medical History/history of current problem: Pt reports injury likely from playing excessive golf        OBJECTIVE:   Date:  Hand Dominance:     [x]  Right    [] Left 3/4/2019    3/29/19  4/12/19  4/19/19  5/2/19  5/15/19  5/28/19  5/31/19  6/4/19  6/14/19    Objective Measures:              PAIN 4-7/10 4/10   1-2 0/10       Quick DASH 59%    18% 0%   Digits tip to DPFC in cm WFL            Thumb ROM WFL            Thumb opposition  WFL            Wrist ROM Ext/Flex 45/70:            Rad/Uln dev ROM R:  L:            Forearm ROM  Sup/pron 67/82            Elbow ROM Ext/flex 5/80 WNL's after heat and stretch            Shoulder Flex  Shoulder Abd  Shoulder IR/ER WFL            Edema in cm circumf. MCPJs R:  L:            Edema in cm circumf. Wrist R:  L:             strength in lbs NT  R: 65  L: 40 R: 100#  L: 63#   L: 75# 100  86   L: 80#    L: 84# after DN and manuals    L: 90#   L: 80#   L:85#   Pinch Strengthin lbs: lat  R:  L:            Pinch Strength in lbs:  3 point R:  L:               MMT:       tricep    R 4-/5  L 3+/5  Bicep   R: 4-/5  L: 3+/5  tricep strength L: 3+/5                  Modalities: 4/5/19  4/11/19  4/12/19  4/19/19  4/25/19  5/2/19  5/3/19  5/10/19 5/15/19  5/21/19  5/24/19  5/28/19  5/31/19  6/4/19  6/14/19    HP US x 8' cont  US x 8' cont  US x 8' US x 8' HP and IFC x 15' US x 8' cont lat elbow and tricep x 8' US cont triceps and lat elbow 8' 8' 8 min 1.4 w/cm2 US x 8' Cont US x 8' cont. US x 8' cont. US x 8' cont  US x 8' cont.     INF + HP 10' 10' 15'  US 10'  US x 8' xcont              Therapeutic Exercise & Activities:                  Pt educ                  finisher  8' with 5# 6#               putty                  Power  elbow tucked  #1 x 10 x 2       Flex bar red eccentric wrist x 15     S/p withh 1 wt x 15     Finisher 5# x 5'    Mid row red x 15 x 2        X 20 each         Same             Forearm stretching with elbow slightly bent x 10  #2 x 20 x 2      Flex bar eccentric wrist x 15 x 2     2 wts x 15 x 2       6# x 5'      Mid row green 15 x 2  #2 x 20 x 2       Red x 20         Same             Eccentric 2# wrist curls x 15 x 2  Same       Same                     Bicep curls 5# 10 x 2    Finisher 3# diagonals x 5'    Ulnar n glides x 5 (added for home)       Green flex bar x 20 each Finisher 7# diagonals x 5'    Ulnar n glide x 5     Bicep stretch on pole x 10     rebounder 4-6# x 5' chest pass  #3 x 20       Green x 20     Supine SASTM to triceps and triceps stretching 10'                Finisher 8# x 5'    Mid and low row green 15 x 2     Bicep curls 5# 15 x 2       Flex bar Green x20    Cupping to triceps    S/p 3 wts (sore at lateral elbow)    Power  #4-20x    Wrist extension with elbow in varying amount of extension 3# 30 total     Flex bar green x 20     Cupping to triceps     S/p 2 wts 10 x 2    tricep stretch x 10   Forearm stretching x 10     Flex bar green x 20           3 wts x 20       X 10  Same       Green x 20               Triceps stretch x 10  Same       Green x 20                 Toss yellow ball palm down x 20     S/p 3 wts x 20     tricep pulls with black 15 x 2     Wrist curls 3# 15 x 2                Same       Green x 20                       S/p 3 wts x 20           Wrist curls 3# 15 x 2  Same       Green x 20     Power  #4x 20                 20 x 2   Bicep tricep with black band 15 x 2         Wall push up 15 x 2      t band bicep tricep orange band x 15 x 2    (added for home)  Green band mid row 15 x 2   t band green tricep 15 x 2       ER/IR and low row green band 15 x 2                                                                 Wall push up 15 x 2     Added for home   Wall push up 15 x 2     Wall push up 15 x 2       rebounder 8# chest pass  Corner stretch x 10           Same            rebounder 8# x 5'            rebounder 8# x 5'  Corner stretch x 10           rebounder 8# x 5'             Same                        Therapeutic Exercise and NMR EXR  [x] (74404) Provided verbal/tactile cueing for activities related to strengthening, flexibility, endurance, ROM  for improvements in scapular, scapulothoracic and UE control with self care, reaching, carrying, lifting, house/yardwork, driving/computer work.    [] (02682) Provided verbal/tactile cueing for activities related to improving balance, coordination, kinesthetic sense, posture, motor skill, proprioception  to assist with  scapular, scapulothoracic and UE control with self care, reaching, carrying, lifting, house/yardwork, driving/computer work. Therapeutic Activities:    [] ( 16945) therapeutic activities, direct (one on one) patient contact. Use of dynamic activities to improve functional performance.     Activities of Daily Living:  [] (27329) Provided self-care/home management training (i.e., activities of daily living and compensatory training, meal preparation, safety procedures, and instructions in use of assistive technology devices/adaptive equipment)     Home Exercise Program:  3/4/19: See media for AROM HEP  [] (33304) Reviewed/Progressed HEP activities related to strengthening, flexibility, endurance, ROM of scapular, scapulothoracic and UE control with self care, reaching, carrying, lifting, house/yardwork, driving/computer work    Manual Treatments:  STM, scar mass 10'  [x] (29497) Provided manual therapy to mobilize soft tissue/joints of the UE for the purpose of modulating pain, promoting relaxation,  increasing ROM, reducing/eliminating soft tissue swelling/inflammation/restriction, improving soft tissue extensibility and allowing for proper ROM for normal function with self care, reaching, carrying, lifting, house/yardwork, driving/computer work                  Splinting:  [] Fabrication of: L-code  [] (30488) Checkout for orthotic/prosthetic use, established patient   [] (79932) Orthotic management and training (fitting and assessment)  [] Comments:    Charges:  Timed Code Treatment Minutes: 40   Total Treatment Minutes: 40   [] EVAL (LOW) 10466   [] OT Re-eval (71337)  [] EVAL (MOD) 24794   [] EVAL (HIGH) 25079       [x] Chasity (29503) x  2   [] OFPPG(77960)  [] NMR (48246) x      [] Estim (attended) (32305)   [x] Manual ([address] [facility]) x  1    [] US (17371)  [] TA

## 2019-06-20 ENCOUNTER — HOSPITAL ENCOUNTER (OUTPATIENT)
Dept: OCCUPATIONAL THERAPY | Age: 44
Setting detail: THERAPIES SERIES
Discharge: HOME | End: 2019-06-20
Payer: COMMERCIAL

## 2019-06-20 PROCEDURE — 97110 THERAPEUTIC EXERCISES: CPT | Performed by: OCCUPATIONAL THERAPIST

## 2019-06-20 PROCEDURE — 97140 MANUAL THERAPY 1/> REGIONS: CPT | Performed by: OCCUPATIONAL THERAPIST

## 2019-06-20 PROCEDURE — 97035 APP MDLTY 1+ULTRASOUND EA 15: CPT | Performed by: OCCUPATIONAL THERAPIST

## 2019-06-20 NOTE — DISCHARGE SUMMARY
[facility], [address] [place] [address] [place]  Phone: [phone]  Fax [phone]    Occupational Therapy Discharge  Date: 2019        Patient Name:  Urmila Lino    :  1975  MRN: [id number]  Referring Physician: Referring Practitioner: Tylor Pandya                                                Evaluation Date: 3/4/2019                                           Medical Diagnosis Information:  Diagnosis: M77.12 (ICD-10-CM) - Lateral epicondylitis of left elbow;   Treatment Dx L elbow painM25. 522                      Date of injury:18  Date and type of Surgery:19 s/p Lateral Tendon Repair                                   Insurance information:  [employer]  61 visits ; no co-pay    Comorbidities Affecting Functional Performance:     []Anxiety (F41.9)/Depression (F32.9)   []Diabetes Type 1(E10.65) or 2 (E11.65)   []Rheumatoid Arthritis (M05.9)  []Fibromyalgia (M79.7)  []Neuropathy(G60.9)  []Osteoarthritis(M19.91)  [x]None   []Other:    Date of Patient follow up with Physician: 3/12/19  Preferred Language for Healthcare:   [x]English       []other:  Latex Allergy:  [x]NO      []YES  RESTRICTIONS/PRECAUTIONS:    Progress Note: [x]  Yes  []  No  Next due by : Visit #10       Visit # Insurance Allowable Requires auth   21 60    no:[]                  yes:[]    From 3/4/19 to 2019    Pain level:  1-2/10 with use      Subjective   Doing well.  Ready for DC        Relevant Medical History/history of current problem: Pt reports injury likely from playing excessive golf        OBJECTIVE:   Date:  Hand Dominance:     [x]  Right    [] Left 3/4/2019    3/29/19  4/12/19  4/19/19  5/2/19  5/15/19  5/28/19  5/31/19  6/4/19  6/14/19  6/20/19    Objective Measures:               PAIN 4-7/10 4/10   1-2 0/10     0/10    Quick DASH 59%    18%     0% 0%   Digits tip to DPFC in cm WFL             Thumb ROM WFL             Thumb Traction [] Lumbar Traction  [x] Neuromuscular Re-education [] Cold/hotpack [] Iontophoresis  [x] Instruction in HEP      Other:  [x] Manual Therapy                   []                       ?           []   Assessment:  [x] All Goals were achieved. [] The following goals were achieved (#'s):  [] The following goals were not achieved for the following reasons:/assessmen of improvement as it relates to each goal:    Plan of Care:  [x] Discharge from Therapy Services due to:    Reason for Discharge:   [x] All goals achieved    [] Patient having surgery  [] Physician discontinued therapy  [] Insurance/Financial Limitations [] Patient did not return for follow up visits [] Home program/1 visit only   [] No subjective or objective improvement [] Plateaued   [] Patient was unable to adhere to the plan of care established at evaluation. [] Referred back to physician for re-evaluation and did not return.      [] Other:?       Electronically signed by:  Connie Short OTR/EZEKIEL [id number]

## 2019-06-20 NOTE — FLOWSHEET NOTE
[facility], [address] [place]  Phone: [phone]  Fax [phone]  Hand Therapy Daily Treatment Note  Patient: Praneeth Rogel        \"Salvador\"               : 1975                                  MRN: [id number]  Referring Physician: Referring Practitioner: Aly Bean                                                Evaluation Date: 3/4/2019                                           Medical Diagnosis Information:  Diagnosis: M77.12 (ICD-10-CM) - Lateral epicondylitis of left elbow;   Treatment Dx L elbow painM25. 522                      Date of injury:18  Date and type of Surgery:19 s/p Lateral Tendon Repair                                   Insurance information:  [employer]  61 visits ; no co-pay    Comorbidities Affecting Functional Performance:     []Anxiety (F41.9)/Depression (F32.9)   []Diabetes Type 1(E10.65) or 2 (E11.65)   []Rheumatoid Arthritis (M05.9)  []Fibromyalgia (M79.7)  []Neuropathy(G60.9)  []Osteoarthritis(M19.91)  [x]None   []Other:    Date of Patient follow up with Physician: 3/12/19  Preferred Language for Healthcare:   [x]English       []other:  Latex Allergy:  [x]NO      []YES  RESTRICTIONS/PRECAUTIONS:    Progress Note: [x]  Yes  []  No  Next due by : Visit #10       Visit # Insurance Allowable Requires auth   21 60    no:[]                  yes:[]    From 3/4/19 to 2019    Pain level:  1-2/10 with use      Subjective   Doing well.  Ready for DC      Postitive tinels over cubital tunnel today 19       Relevant Medical History/history of current problem: Pt reports injury likely from playing excessive golf        OBJECTIVE:   Date:  Hand Dominance:     [x]  Right    [] Left 3/4/2019    3/29/19  4/12/19  4/19/19  5/2/19  5/15/19  5/28/19  5/31/19  6/4/19  6/14/19  6/20/19    Objective Measures:               PAIN 4-7/10 4/10   1-2 0/10     0/10    Quick DASH 59%    18%     0% 0% Digits tip to DPFC in cm WFL             Thumb ROM WFL             Thumb opposition  WFL             Wrist ROM Ext/Flex 45/70:             Rad/Uln dev ROM R:  L:             Forearm ROM  Sup/pron 67/82             Elbow ROM Ext/flex 5/80 WNL's after heat and stretch             Shoulder Flex  Shoulder Abd  Shoulder IR/ER WFL             Edema in cm circumf. MCPJs R:  L:             Edema in cm circumf. Wrist R:  L:              strength in lbs NT  R: 65  L: 40 R: 100#  L: 63#   L: 75# 100  86   L: 80#    L: 84# after DN and manuals    L: 90#   L: 80#   L:85#   L: 90#   Pinch Strengthin lbs: lat  R:  L:             Pinch Strength in lbs:  3 point R:  L:                MMT:       tricep    R 4-/5  L 3+/5  Bicep   R: 4-/5  L: 3+/5  tricep strength L: 3+/5     L tricep 4-/5              Modalities: 4/5/19  4/11/19  4/12/19  4/19/19  4/25/19  5/2/19  5/3/19  5/10/19 5/15/19  5/21/19  5/24/19  5/28/19  5/31/19  6/4/19  6/14/19  6/20/19    HP US x 8' cont  US x 8' cont  US x 8' US x 8' HP and IFC x 15' US x 8' cont lat elbow and tricep x 8' US cont triceps and lat elbow 8' 8' 8 min 1.4 w/cm2 US x 8' Cont US x 8' cont. US x 8' cont. US x 8' cont  US x 8' cont.   US x 8'   INF + HP 10' 10' 15'  US 10'  US x 8' xcont               Therapeutic Exercise & Activities:                   Pt educ                   finisher  8' with 5# 6#                putty                   Power  elbow tucked  #1 x 10 x 2       Flex bar red eccentric wrist x 15     S/p withh 1 wt x 15     Finisher 5# x 5'    Mid row red x 15 x 2        X 20 each         Same             Forearm stretching with elbow slightly bent x 10  #2 x 20 x 2      Flex bar eccentric wrist x 15 x 2     2 wts x 15 x 2       6# x 5'      Mid row green 15 x 2  #2 x 20 x 2       Red x 20         Same             Eccentric 2# wrist curls x 15 x 2  Same       Same                     Bicep curls 5# 10 x 2    Finisher 3# diagonals x 5'    Ulnar n glides x 5 (added for improvements in scapular, scapulothoracic and UE control with self care, reaching, carrying, lifting, house/yardwork, driving/computer work.    [] (70211) Provided verbal/tactile cueing for activities related to improving balance, coordination, kinesthetic sense, posture, motor skill, proprioception  to assist with  scapular, scapulothoracic and UE control with self care, reaching, carrying, lifting, house/yardwork, driving/computer work. Therapeutic Activities:    [] ( 71381) therapeutic activities, direct (one on one) patient contact. Use of dynamic activities to improve functional performance. Activities of Daily Living:  [] (97466) Provided self-care/home management training (i.e., activities of daily living and compensatory training, meal preparation, safety procedures, and instructions in use of assistive technology devices/adaptive equipment)     Home Exercise Program:  3/4/19: See media for AROM HEP  [] (97133) Reviewed/Progressed HEP activities related to strengthening, flexibility, endurance, ROM of scapular, scapulothoracic and UE control with self care, reaching, carrying, lifting, house/yardwork, driving/computer work    Manual Treatments:  STM, scar mass 10'  [x] (73618) Provided manual therapy to mobilize soft tissue/joints of the UE for the purpose of modulating pain, promoting relaxation,  increasing ROM, reducing/eliminating soft tissue swelling/inflammation/restriction, improving soft tissue extensibility and allowing for proper ROM for normal function with self care, reaching, carrying, lifting, house/yardwork, driving/computer work    Spoke with    regarding the use of Dry Needling     Dry needling manual therapy: consisted on the placement of 3 needles in the following muscles:  Triceps long and short head. A 40 mm needle was inserted, piston, rotated, and coned to produce intramuscular mobilization.   These techniques were used to restore functional range of motion, reduce muscle spasm and induce healing in the corresponding musculature. (43017)  Clean Technique was utilized today while applying Dry needling treatment. The treatment sites where cleaned with 70% solution of  isopropyl alcohol . The OT washed their hands and utilized treatment gloves along with hand  prior to inserting the needles. All needles where removed and discarded in the appropriate sharps container. MD has given verbal and/or written approval for this treatment. Splinting:  [] Fabrication of: L-code  [] (33595) Checkout for orthotic/prosthetic use, established patient   [] (32697) Orthotic management and training (fitting and assessment)  [] Comments:    Charges:  Timed Code Treatment Minutes: 55   Total Treatment Minutes: 55   [] EVAL (LOW) 34584   [] OT Re-eval (39279)  [] EVAL (MOD) 01453   [] EVAL (HIGH) 81831       [x] Chasity (35067) x  2   [] TBICK(34635)  [] NMR (30506) x      [] Estim (attended) (76920)   [x] Manual ([phone]) x  1    [x] US (09874)  [] TA (83382) x      [] Paraffin (36246)  [] ADL  (09512) x     [] Splint/L code:    [] Estim (unattended) (93580)  [] Other:  [] Fluidotherapy (99001)  [](93126) Checkout for orthotic use, established patient x       [] (82619) Orthotic mgmt & training  x        GOALS:  Short Term Goals: To be achieved in: 2 weeks  1. Independent in HEP and progression per patient tolerance, in order to prevent re-injury. 2. Patient will have a decrease in pain to facilitate improvement in movement, function, and ADLs as indicated by Functional Deficits.     Long Term Goals to be achieved in 6  weeks, including patient directed goals to address identified performance deficits:  1) Pt to be independent in graded HEP progression with a good level of effort and compliance. MET  2) Pt to report a score of 25 % or less on the Quick DASH disability questionnaire for increased performance with carrying, moving, and handling objects.  MET  3) Pt will demonstrate increased ROM to WFL for improved performance with self care tasks, home mgt tasks, recreational endeavors, carrying, and lifting. MET  4) Pt will demonstrate increased  strength to at least 50% of R hand for improved performance with self care tasks, home mgt tasks, recreational endeavors, carrying, and lifting. MET  5) Pt will have a decrease in pain to 2/10 or less to facilitate improvement in performance with self care tasks, home mgt tasks, recreational endeavors, carrying, and lifting. NOT MET     New goal: Pt. Will increase L  strength to 85# for opening things and doing house/yard work. Set 5/2/19 - met 5/15/19       New goal: Increase L tricep strength 1/2 grade for yardwork tasks. Set 5/31/19       Progression Towards Functional goals:  [x] Patient is progressing as expected towards functional goals listed. [] Progression is slowed due to complexities listed. [] Progression has been slowed due to co-morbidities. [] Plan just implemented, too soon to assess goals progression  [] Other:     ASSESSMENT:  Ready for DC       Treatment/Activity Tolerance:  [x] Patient tolerated treatment well [] Patient limited by fatique  [] Patient limited by pain  [] Patient limited by other medical complications  [] Other:     Prognosis: [x] Good [] Fair  [] Poor    Patient Requires Follow-up: [x] Yes  [] No    PLAN: Recommend Occupational Therapy 2 times a week for 4 weeks  [] Continue per plan of care [] Alter current plan (see comments)  [] Plan of care initiated [] Hold pending MD visit [x] Discharge    Plan for next session: ROM.  Pain mgt, modalities, progress HEP    Electronically signed by: Korina Carrington OTR/L [id number]

## 2019-06-26 ENCOUNTER — HOSPITAL ENCOUNTER (OUTPATIENT)
Dept: ULTRASOUND IMAGING | Age: 44
Discharge: HOME | End: 2019-06-26
Payer: COMMERCIAL

## 2019-06-26 DIAGNOSIS — R10.2 MALE PELVIC PAIN: ICD-10-CM

## 2019-06-26 PROCEDURE — 76870 US EXAM SCROTUM: CPT

## 2019-07-29 ENCOUNTER — OFFICE VISIT (OUTPATIENT)
Dept: ORTHOPEDIC SURGERY | Age: 44
End: 2019-07-29
Payer: COMMERCIAL

## 2019-07-29 VITALS — BODY MASS INDEX: 25.49 KG/M2 | WEIGHT: 182.1 LBS | HEIGHT: 71 IN

## 2019-07-29 DIAGNOSIS — M77.12 LATERAL EPICONDYLITIS OF LEFT ELBOW: Primary | ICD-10-CM

## 2019-07-29 PROCEDURE — 99212 OFFICE O/P EST SF 10 MIN: CPT | Performed by: ORTHOPAEDIC SURGERY

## 2019-07-29 NOTE — PROGRESS NOTES
Chief Complaint   Patient presents with    Follow-up     s/p 2/28/19 lateral epicondylitis, tendon repair        HISTORY OF PRESENT ILLNESS:  Agatha Perdomo is a 45 y.o.  patient here for repeat evaluation after undergoing left elbow lateral epicondylitis debridement and tendon repair, now approximately 5 months following surgery. He continues to advance progressively inconsistently. He has begun golfing regularly once again and overall is doing quite well. He has an occasional rare ache in the elbow. He is pleased that he had the procedure done. ROS:  ROS neg     Past medical history is reviewed again today, no changes to report    PHYSICAL EXAMINATION:  Patient is alert and pleasant, in no acute distress. The affected extremity is examined today. Left elbow reveals a well-healed surgical site with full motion. No instability. No pain over the incision today. Intact neurovascular exam left hand    X-rays: none needed today    IMPRESSION AND PLAN: Left elbow lateral epicondylitis  Doing well. We will continue with our current care plan. Activities as tolerated. He understands if and activity bothers his elbow he will back off. I suspect that he will continue to improve fully as time goes by. He will come back as symptoms dictate. All questions and concerns were addressed today. Patient is in agreement with the plan. Aleena Robles MD  Hand & Upper Extremity Surgery  [address]  A partner of [facility] ([facility])        Please note that this transcription was created using voice recognition software. Any errors are unintentional and may be due to voice recognition transcription.

## 2019-11-01 ENCOUNTER — TELEPHONE (OUTPATIENT)
Dept: ORTHOPEDIC SURGERY | Age: 44
End: 2019-11-01

## 2019-11-01 ENCOUNTER — OFFICE VISIT (OUTPATIENT)
Dept: ORTHOPEDIC SURGERY | Age: 44
End: 2019-11-01
Payer: COMMERCIAL

## 2019-11-01 DIAGNOSIS — M77.11 LATERAL EPICONDYLITIS, RIGHT ELBOW: ICD-10-CM

## 2019-11-01 DIAGNOSIS — M25.521 RIGHT ELBOW PAIN: Primary | ICD-10-CM

## 2019-11-01 PROCEDURE — 99213 OFFICE O/P EST LOW 20 MIN: CPT | Performed by: ORTHOPAEDIC SURGERY

## 2019-11-04 ENCOUNTER — HOSPITAL ENCOUNTER (OUTPATIENT)
Dept: MRI IMAGING | Age: 44
Discharge: HOME | End: 2019-11-04
Payer: COMMERCIAL

## 2019-11-04 DIAGNOSIS — M77.11 LATERAL EPICONDYLITIS, RIGHT ELBOW: ICD-10-CM

## 2019-11-04 PROCEDURE — 73221 MRI JOINT UPR EXTREM W/O DYE: CPT

## 2019-11-06 ENCOUNTER — TELEPHONE (OUTPATIENT)
Dept: ORTHOPEDIC SURGERY | Age: 44
End: 2019-11-06

## 2019-11-06 ENCOUNTER — OFFICE VISIT (OUTPATIENT)
Dept: ORTHOPEDIC SURGERY | Age: 44
End: 2019-11-06
Payer: COMMERCIAL

## 2019-11-06 VITALS — HEIGHT: 71 IN | WEIGHT: 182.1 LBS | BODY MASS INDEX: 25.49 KG/M2

## 2019-11-06 DIAGNOSIS — M77.11 RIGHT LATERAL EPICONDYLITIS: Primary | ICD-10-CM

## 2019-11-06 DIAGNOSIS — M77.11 LATERAL EPICONDYLITIS, RIGHT ELBOW: Primary | ICD-10-CM

## 2019-11-06 PROCEDURE — 99213 OFFICE O/P EST LOW 20 MIN: CPT | Performed by: ORTHOPAEDIC SURGERY

## 2019-11-07 ENCOUNTER — TELEPHONE (OUTPATIENT)
Dept: ORTHOPEDIC SURGERY | Age: 44
End: 2019-11-07

## 2019-11-07 ENCOUNTER — ANESTHESIA EVENT (OUTPATIENT)
Dept: OPERATING ROOM | Age: 44
End: 2019-11-07
Payer: COMMERCIAL

## 2019-11-08 ENCOUNTER — ANESTHESIA (OUTPATIENT)
Dept: OPERATING ROOM | Age: 44
End: 2019-11-08
Payer: COMMERCIAL

## 2019-11-08 ENCOUNTER — HOSPITAL ENCOUNTER (OUTPATIENT)
Age: 44
Setting detail: OUTPATIENT SURGERY
Discharge: HOME | End: 2019-11-08
Attending: ORTHOPAEDIC SURGERY | Admitting: ORTHOPAEDIC SURGERY
Payer: COMMERCIAL

## 2019-11-08 VITALS
TEMPERATURE: 98 F | OXYGEN SATURATION: 97 % | HEART RATE: 71 BPM | HEIGHT: 71 IN | SYSTOLIC BLOOD PRESSURE: 124 MMHG | BODY MASS INDEX: 24.5 KG/M2 | RESPIRATION RATE: 16 BRPM | WEIGHT: 175 LBS | DIASTOLIC BLOOD PRESSURE: 76 MMHG

## 2019-11-08 VITALS
RESPIRATION RATE: 2 BRPM | SYSTOLIC BLOOD PRESSURE: 93 MMHG | OXYGEN SATURATION: 98 % | DIASTOLIC BLOOD PRESSURE: 54 MMHG

## 2019-11-08 DIAGNOSIS — M77.11 LATERAL EPICONDYLITIS, RIGHT ELBOW: Primary | ICD-10-CM

## 2019-11-08 PROCEDURE — 7100000011 HC PHASE II RECOVERY - ADDTL 15 MIN: Performed by: ORTHOPAEDIC SURGERY

## 2019-11-08 PROCEDURE — 7100000010 HC PHASE II RECOVERY - FIRST 15 MIN: Performed by: ORTHOPAEDIC SURGERY

## 2019-11-08 PROCEDURE — 2500000003 HC RX 250 WO HCPCS: Performed by: ORTHOPAEDIC SURGERY

## 2019-11-08 PROCEDURE — 3600000003 HC SURGERY LEVEL 3 BASE: Performed by: ORTHOPAEDIC SURGERY

## 2019-11-08 PROCEDURE — 3700000000 HC ANESTHESIA ATTENDED CARE: Performed by: ORTHOPAEDIC SURGERY

## 2019-11-08 PROCEDURE — 2580000003 HC RX 258: Performed by: ANESTHESIOLOGY

## 2019-11-08 PROCEDURE — 2500000003 HC RX 250 WO HCPCS: Performed by: NURSE ANESTHETIST, CERTIFIED REGISTERED

## 2019-11-08 PROCEDURE — 7100000001 HC PACU RECOVERY - ADDTL 15 MIN: Performed by: ORTHOPAEDIC SURGERY

## 2019-11-08 PROCEDURE — 6360000002 HC RX W HCPCS: Performed by: NURSE ANESTHETIST, CERTIFIED REGISTERED

## 2019-11-08 PROCEDURE — 3700000001 HC ADD 15 MINUTES (ANESTHESIA): Performed by: ORTHOPAEDIC SURGERY

## 2019-11-08 PROCEDURE — 2709999900 HC NON-CHARGEABLE SUPPLY: Performed by: ORTHOPAEDIC SURGERY

## 2019-11-08 PROCEDURE — 3600000013 HC SURGERY LEVEL 3 ADDTL 15MIN: Performed by: ORTHOPAEDIC SURGERY

## 2019-11-08 PROCEDURE — 7100000000 HC PACU RECOVERY - FIRST 15 MIN: Performed by: ORTHOPAEDIC SURGERY

## 2019-11-08 PROCEDURE — 6360000002 HC RX W HCPCS: Performed by: ORTHOPAEDIC SURGERY

## 2019-11-08 RX ORDER — FENTANYL CITRATE 50 UG/ML
INJECTION, SOLUTION INTRAMUSCULAR; INTRAVENOUS PRN
Status: DISCONTINUED | OUTPATIENT
Start: 2019-11-08 | End: 2019-11-08 | Stop reason: SDUPTHER

## 2019-11-08 RX ORDER — LIDOCAINE HYDROCHLORIDE 10 MG/ML
1 INJECTION, SOLUTION EPIDURAL; INFILTRATION; INTRACAUDAL; PERINEURAL
Status: DISCONTINUED | OUTPATIENT
Start: 2019-11-08 | End: 2019-11-08 | Stop reason: HOSPADM

## 2019-11-08 RX ORDER — HYDROCODONE BITARTRATE AND ACETAMINOPHEN 5; 325 MG/1; MG/1
1 TABLET ORAL EVERY 8 HOURS PRN
Qty: 15 TABLET | Refills: 0 | Status: SHIPPED | OUTPATIENT
Start: 2019-11-08 | End: 2019-11-13

## 2019-11-08 RX ORDER — LABETALOL HYDROCHLORIDE 5 MG/ML
5 INJECTION, SOLUTION INTRAVENOUS EVERY 10 MIN PRN
Status: DISCONTINUED | OUTPATIENT
Start: 2019-11-08 | End: 2019-11-08 | Stop reason: HOSPADM

## 2019-11-08 RX ORDER — SODIUM CHLORIDE, SODIUM LACTATE, POTASSIUM CHLORIDE, CALCIUM CHLORIDE 600; 310; 30; 20 MG/100ML; MG/100ML; MG/100ML; MG/100ML
INJECTION, SOLUTION INTRAVENOUS CONTINUOUS
Status: DISCONTINUED | OUTPATIENT
Start: 2019-11-08 | End: 2019-11-08 | Stop reason: HOSPADM

## 2019-11-08 RX ORDER — OXYCODONE HYDROCHLORIDE AND ACETAMINOPHEN 5; 325 MG/1; MG/1
2 TABLET ORAL PRN
Status: DISCONTINUED | OUTPATIENT
Start: 2019-11-08 | End: 2019-11-08 | Stop reason: HOSPADM

## 2019-11-08 RX ORDER — BUPIVACAINE HYDROCHLORIDE 2.5 MG/ML
INJECTION, SOLUTION EPIDURAL; INFILTRATION; INTRACAUDAL
Status: DISCONTINUED
Start: 2019-11-08 | End: 2019-11-08 | Stop reason: HOSPADM

## 2019-11-08 RX ORDER — PROPOFOL 10 MG/ML
INJECTION, EMULSION INTRAVENOUS PRN
Status: DISCONTINUED | OUTPATIENT
Start: 2019-11-08 | End: 2019-11-08 | Stop reason: SDUPTHER

## 2019-11-08 RX ORDER — HYDRALAZINE HYDROCHLORIDE 20 MG/ML
5 INJECTION INTRAMUSCULAR; INTRAVENOUS EVERY 10 MIN PRN
Status: DISCONTINUED | OUTPATIENT
Start: 2019-11-08 | End: 2019-11-08 | Stop reason: HOSPADM

## 2019-11-08 RX ORDER — DEXAMETHASONE SODIUM PHOSPHATE 4 MG/ML
INJECTION, SOLUTION INTRA-ARTICULAR; INTRALESIONAL; INTRAMUSCULAR; INTRAVENOUS; SOFT TISSUE PRN
Status: DISCONTINUED | OUTPATIENT
Start: 2019-11-08 | End: 2019-11-08 | Stop reason: SDUPTHER

## 2019-11-08 RX ORDER — OXYCODONE HYDROCHLORIDE AND ACETAMINOPHEN 5; 325 MG/1; MG/1
1 TABLET ORAL PRN
Status: DISCONTINUED | OUTPATIENT
Start: 2019-11-08 | End: 2019-11-08 | Stop reason: HOSPADM

## 2019-11-08 RX ORDER — BUPIVACAINE HYDROCHLORIDE 2.5 MG/ML
INJECTION, SOLUTION INFILTRATION; PERINEURAL PRN
Status: DISCONTINUED | OUTPATIENT
Start: 2019-11-08 | End: 2019-11-08 | Stop reason: ALTCHOICE

## 2019-11-08 RX ORDER — ONDANSETRON 2 MG/ML
INJECTION INTRAMUSCULAR; INTRAVENOUS PRN
Status: DISCONTINUED | OUTPATIENT
Start: 2019-11-08 | End: 2019-11-08 | Stop reason: SDUPTHER

## 2019-11-08 RX ORDER — LIDOCAINE HYDROCHLORIDE 20 MG/ML
INJECTION, SOLUTION INFILTRATION; PERINEURAL PRN
Status: DISCONTINUED | OUTPATIENT
Start: 2019-11-08 | End: 2019-11-08 | Stop reason: SDUPTHER

## 2019-11-08 RX ORDER — SODIUM CHLORIDE 0.9 % (FLUSH) 0.9 %
10 SYRINGE (ML) INJECTION PRN
Status: DISCONTINUED | OUTPATIENT
Start: 2019-11-08 | End: 2019-11-08 | Stop reason: HOSPADM

## 2019-11-08 RX ORDER — ONDANSETRON 2 MG/ML
4 INJECTION INTRAMUSCULAR; INTRAVENOUS EVERY 10 MIN PRN
Status: DISCONTINUED | OUTPATIENT
Start: 2019-11-08 | End: 2019-11-08 | Stop reason: HOSPADM

## 2019-11-08 RX ORDER — SODIUM CHLORIDE 0.9 % (FLUSH) 0.9 %
10 SYRINGE (ML) INJECTION EVERY 12 HOURS SCHEDULED
Status: DISCONTINUED | OUTPATIENT
Start: 2019-11-08 | End: 2019-11-08 | Stop reason: HOSPADM

## 2019-11-08 RX ADMIN — DEXAMETHASONE SODIUM PHOSPHATE 8 MG: 4 INJECTION, SOLUTION INTRAMUSCULAR; INTRAVENOUS at 14:16

## 2019-11-08 RX ADMIN — SODIUM CHLORIDE, POTASSIUM CHLORIDE, SODIUM LACTATE AND CALCIUM CHLORIDE: 600; 310; 30; 20 INJECTION, SOLUTION INTRAVENOUS at 12:05

## 2019-11-08 RX ADMIN — LIDOCAINE HYDROCHLORIDE 100 MG: 20 INJECTION, SOLUTION INFILTRATION; PERINEURAL at 14:13

## 2019-11-08 RX ADMIN — FENTANYL CITRATE 12.5 MCG: 50 INJECTION INTRAMUSCULAR; INTRAVENOUS at 14:09

## 2019-11-08 RX ADMIN — FENTANYL CITRATE 87.5 MCG: 50 INJECTION INTRAMUSCULAR; INTRAVENOUS at 14:18

## 2019-11-08 RX ADMIN — PROPOFOL 200 MG: 10 INJECTION, EMULSION INTRAVENOUS at 14:13

## 2019-11-08 RX ADMIN — Medication 2 G: at 14:16

## 2019-11-08 RX ADMIN — ONDANSETRON 4 MG: 2 INJECTION, SOLUTION INTRAMUSCULAR; INTRAVENOUS at 14:08

## 2019-11-08 ASSESSMENT — PULMONARY FUNCTION TESTS
PIF_VALUE: 2
PIF_VALUE: 2
PIF_VALUE: 14
PIF_VALUE: 2
PIF_VALUE: 3
PIF_VALUE: 0
PIF_VALUE: 2
PIF_VALUE: 14
PIF_VALUE: 2
PIF_VALUE: 2
PIF_VALUE: 0
PIF_VALUE: 18
PIF_VALUE: 2
PIF_VALUE: 2
PIF_VALUE: 1
PIF_VALUE: 3
PIF_VALUE: 4
PIF_VALUE: 3
PIF_VALUE: 1
PIF_VALUE: 4
PIF_VALUE: 3
PIF_VALUE: 2
PIF_VALUE: 17
PIF_VALUE: 0
PIF_VALUE: 18
PIF_VALUE: 2
PIF_VALUE: 4
PIF_VALUE: 2
PIF_VALUE: 0
PIF_VALUE: 2
PIF_VALUE: 5
PIF_VALUE: 2
PIF_VALUE: 20
PIF_VALUE: 1
PIF_VALUE: 2
PIF_VALUE: 3
PIF_VALUE: 2
PIF_VALUE: 2
PIF_VALUE: 3
PIF_VALUE: 2
PIF_VALUE: 2
PIF_VALUE: 4

## 2019-11-08 ASSESSMENT — PAIN DESCRIPTION - LOCATION: LOCATION: ELBOW

## 2019-11-08 ASSESSMENT — PAIN DESCRIPTION - DESCRIPTORS
DESCRIPTORS: ACHING
DESCRIPTORS: ACHING

## 2019-11-08 ASSESSMENT — PAIN SCALES - GENERAL: PAINLEVEL_OUTOF10: 2

## 2019-11-08 ASSESSMENT — PAIN DESCRIPTION - PAIN TYPE: TYPE: SURGICAL PAIN

## 2019-11-08 ASSESSMENT — PAIN DESCRIPTION - ORIENTATION: ORIENTATION: RIGHT

## 2019-11-12 ENCOUNTER — HOSPITAL ENCOUNTER (OUTPATIENT)
Dept: OCCUPATIONAL THERAPY | Age: 44
Setting detail: THERAPIES SERIES
Discharge: HOME | End: 2019-11-12
Payer: COMMERCIAL

## 2019-11-12 PROCEDURE — G0283 ELEC STIM OTHER THAN WOUND: HCPCS | Performed by: OCCUPATIONAL THERAPIST

## 2019-11-12 PROCEDURE — 97110 THERAPEUTIC EXERCISES: CPT | Performed by: OCCUPATIONAL THERAPIST

## 2019-11-12 PROCEDURE — 97165 OT EVAL LOW COMPLEX 30 MIN: CPT | Performed by: OCCUPATIONAL THERAPIST

## 2019-11-14 ENCOUNTER — HOSPITAL ENCOUNTER (OUTPATIENT)
Dept: OCCUPATIONAL THERAPY | Age: 44
Setting detail: THERAPIES SERIES
Discharge: HOME | End: 2019-11-14
Payer: COMMERCIAL

## 2019-11-14 PROCEDURE — 97140 MANUAL THERAPY 1/> REGIONS: CPT | Performed by: OCCUPATIONAL THERAPIST

## 2019-11-14 PROCEDURE — G0283 ELEC STIM OTHER THAN WOUND: HCPCS | Performed by: OCCUPATIONAL THERAPIST

## 2019-11-14 PROCEDURE — 97110 THERAPEUTIC EXERCISES: CPT | Performed by: OCCUPATIONAL THERAPIST

## 2019-11-19 ENCOUNTER — HOSPITAL ENCOUNTER (OUTPATIENT)
Dept: OCCUPATIONAL THERAPY | Age: 44
Setting detail: THERAPIES SERIES
Discharge: HOME | End: 2019-11-19
Payer: COMMERCIAL

## 2019-11-19 PROCEDURE — 97140 MANUAL THERAPY 1/> REGIONS: CPT | Performed by: OCCUPATIONAL THERAPIST

## 2019-11-19 PROCEDURE — 97035 APP MDLTY 1+ULTRASOUND EA 15: CPT | Performed by: OCCUPATIONAL THERAPIST

## 2019-11-19 PROCEDURE — 97110 THERAPEUTIC EXERCISES: CPT | Performed by: OCCUPATIONAL THERAPIST

## 2019-11-20 ENCOUNTER — OFFICE VISIT (OUTPATIENT)
Dept: ORTHOPEDIC SURGERY | Age: 44
End: 2019-11-20

## 2019-11-20 VITALS — WEIGHT: 175.04 LBS | BODY MASS INDEX: 24.51 KG/M2 | HEIGHT: 71 IN

## 2019-11-20 DIAGNOSIS — M77.11 RIGHT LATERAL EPICONDYLITIS: Primary | ICD-10-CM

## 2019-11-20 PROCEDURE — 99024 POSTOP FOLLOW-UP VISIT: CPT | Performed by: ORTHOPAEDIC SURGERY

## 2019-11-21 ENCOUNTER — HOSPITAL ENCOUNTER (OUTPATIENT)
Dept: OCCUPATIONAL THERAPY | Age: 44
Setting detail: THERAPIES SERIES
Discharge: HOME | End: 2019-11-21
Payer: COMMERCIAL

## 2019-11-21 PROCEDURE — 97035 APP MDLTY 1+ULTRASOUND EA 15: CPT | Performed by: OCCUPATIONAL THERAPIST

## 2019-11-21 PROCEDURE — 97110 THERAPEUTIC EXERCISES: CPT | Performed by: OCCUPATIONAL THERAPIST

## 2019-11-21 PROCEDURE — 97140 MANUAL THERAPY 1/> REGIONS: CPT | Performed by: OCCUPATIONAL THERAPIST

## 2019-11-26 ENCOUNTER — HOSPITAL ENCOUNTER (OUTPATIENT)
Dept: OCCUPATIONAL THERAPY | Age: 44
Setting detail: THERAPIES SERIES
Discharge: HOME | End: 2019-11-26
Payer: COMMERCIAL

## 2019-11-26 PROCEDURE — 97140 MANUAL THERAPY 1/> REGIONS: CPT | Performed by: OCCUPATIONAL THERAPIST

## 2019-11-26 PROCEDURE — 97110 THERAPEUTIC EXERCISES: CPT | Performed by: OCCUPATIONAL THERAPIST

## 2019-11-29 ENCOUNTER — HOSPITAL ENCOUNTER (OUTPATIENT)
Dept: OCCUPATIONAL THERAPY | Age: 44
Setting detail: THERAPIES SERIES
Discharge: HOME | End: 2019-11-29
Payer: COMMERCIAL

## 2019-12-03 ENCOUNTER — HOSPITAL ENCOUNTER (OUTPATIENT)
Dept: OCCUPATIONAL THERAPY | Age: 44
Setting detail: THERAPIES SERIES
Discharge: HOME | End: 2019-12-03
Payer: COMMERCIAL

## 2019-12-03 PROCEDURE — 97035 APP MDLTY 1+ULTRASOUND EA 15: CPT | Performed by: OCCUPATIONAL THERAPIST

## 2019-12-03 PROCEDURE — 97110 THERAPEUTIC EXERCISES: CPT | Performed by: OCCUPATIONAL THERAPIST

## 2019-12-03 PROCEDURE — 97140 MANUAL THERAPY 1/> REGIONS: CPT | Performed by: OCCUPATIONAL THERAPIST

## 2019-12-05 ENCOUNTER — HOSPITAL ENCOUNTER (OUTPATIENT)
Dept: OCCUPATIONAL THERAPY | Age: 44
Setting detail: THERAPIES SERIES
Discharge: HOME | End: 2019-12-05
Payer: COMMERCIAL

## 2019-12-05 PROCEDURE — 97035 APP MDLTY 1+ULTRASOUND EA 15: CPT | Performed by: OCCUPATIONAL THERAPIST

## 2019-12-05 PROCEDURE — 97140 MANUAL THERAPY 1/> REGIONS: CPT | Performed by: OCCUPATIONAL THERAPIST

## 2019-12-05 PROCEDURE — 97110 THERAPEUTIC EXERCISES: CPT | Performed by: OCCUPATIONAL THERAPIST

## 2019-12-10 ENCOUNTER — HOSPITAL ENCOUNTER (OUTPATIENT)
Dept: OCCUPATIONAL THERAPY | Age: 44
Setting detail: THERAPIES SERIES
Discharge: HOME | End: 2019-12-10
Payer: COMMERCIAL

## 2019-12-10 PROCEDURE — 97035 APP MDLTY 1+ULTRASOUND EA 15: CPT | Performed by: OCCUPATIONAL THERAPIST

## 2019-12-10 PROCEDURE — 97110 THERAPEUTIC EXERCISES: CPT | Performed by: OCCUPATIONAL THERAPIST

## 2019-12-10 PROCEDURE — 97140 MANUAL THERAPY 1/> REGIONS: CPT | Performed by: OCCUPATIONAL THERAPIST

## 2019-12-12 ENCOUNTER — HOSPITAL ENCOUNTER (OUTPATIENT)
Dept: OCCUPATIONAL THERAPY | Age: 44
Setting detail: THERAPIES SERIES
Discharge: HOME | End: 2019-12-12
Payer: COMMERCIAL

## 2019-12-12 PROCEDURE — 97110 THERAPEUTIC EXERCISES: CPT | Performed by: OCCUPATIONAL THERAPIST

## 2019-12-12 PROCEDURE — 97140 MANUAL THERAPY 1/> REGIONS: CPT | Performed by: OCCUPATIONAL THERAPIST

## 2019-12-12 PROCEDURE — 97035 APP MDLTY 1+ULTRASOUND EA 15: CPT | Performed by: OCCUPATIONAL THERAPIST

## 2019-12-17 ENCOUNTER — HOSPITAL ENCOUNTER (OUTPATIENT)
Dept: OCCUPATIONAL THERAPY | Age: 44
Setting detail: THERAPIES SERIES
Discharge: HOME | End: 2019-12-17
Payer: COMMERCIAL

## 2019-12-17 PROCEDURE — 97140 MANUAL THERAPY 1/> REGIONS: CPT | Performed by: OCCUPATIONAL THERAPIST

## 2019-12-17 PROCEDURE — 97110 THERAPEUTIC EXERCISES: CPT | Performed by: OCCUPATIONAL THERAPIST

## 2019-12-17 PROCEDURE — 97035 APP MDLTY 1+ULTRASOUND EA 15: CPT | Performed by: OCCUPATIONAL THERAPIST

## 2019-12-19 ENCOUNTER — HOSPITAL ENCOUNTER (OUTPATIENT)
Dept: OCCUPATIONAL THERAPY | Age: 44
Setting detail: THERAPIES SERIES
Discharge: HOME | End: 2019-12-19
Payer: COMMERCIAL

## 2019-12-19 PROCEDURE — 97110 THERAPEUTIC EXERCISES: CPT | Performed by: OCCUPATIONAL THERAPIST

## 2019-12-19 PROCEDURE — 97035 APP MDLTY 1+ULTRASOUND EA 15: CPT | Performed by: OCCUPATIONAL THERAPIST

## 2019-12-19 PROCEDURE — 97140 MANUAL THERAPY 1/> REGIONS: CPT | Performed by: OCCUPATIONAL THERAPIST

## 2019-12-24 ENCOUNTER — HOSPITAL ENCOUNTER (OUTPATIENT)
Dept: OCCUPATIONAL THERAPY | Age: 44
Setting detail: THERAPIES SERIES
Discharge: HOME | End: 2019-12-24
Payer: COMMERCIAL

## 2019-12-24 PROCEDURE — 97140 MANUAL THERAPY 1/> REGIONS: CPT | Performed by: OCCUPATIONAL THERAPIST

## 2019-12-24 PROCEDURE — 97035 APP MDLTY 1+ULTRASOUND EA 15: CPT | Performed by: OCCUPATIONAL THERAPIST

## 2019-12-24 PROCEDURE — 97110 THERAPEUTIC EXERCISES: CPT | Performed by: OCCUPATIONAL THERAPIST

## 2019-12-27 ENCOUNTER — APPOINTMENT (OUTPATIENT)
Dept: OCCUPATIONAL THERAPY | Age: 44
End: 2019-12-27
Payer: COMMERCIAL

## 2019-12-31 ENCOUNTER — HOSPITAL ENCOUNTER (OUTPATIENT)
Dept: OCCUPATIONAL THERAPY | Age: 44
Setting detail: THERAPIES SERIES
Discharge: HOME | End: 2019-12-31
Payer: COMMERCIAL

## 2019-12-31 PROCEDURE — 97110 THERAPEUTIC EXERCISES: CPT | Performed by: OCCUPATIONAL THERAPIST

## 2019-12-31 PROCEDURE — 97035 APP MDLTY 1+ULTRASOUND EA 15: CPT | Performed by: OCCUPATIONAL THERAPIST

## 2019-12-31 PROCEDURE — 97140 MANUAL THERAPY 1/> REGIONS: CPT | Performed by: OCCUPATIONAL THERAPIST

## 2019-12-31 NOTE — FLOWSHEET NOTE
[address] [facility], [address] [place] [address]  Phone: [phone]  Fax [phone]    Occupational Therapy Treatment Note/ Progress Report:     Is this a Progress Report:     []  Yes  [x]  No      If Yes:  Date Range for reporting period:  Beginning 19  Ending  Progress report will be due (10 Rx or 30 days whichever is less):     Recertification will be due (POC Duration  / 90 days whichever is less): 19   Date:  2019  Patient Name:  Phi Melendez    :  1975  MRN: [id number]  Medical/Treatment Diagnosis Information:  · Diagnosis: R lateral epicondylar release Treatment dx: R elbow stiffness M25.621   ·       Insurance/Certification information:   [facility]  Physician Information:  Referring Practitioner: Dr. Princess Mario   Has the plan of care been signed (Y/N):        []  Yes  [x]  No   Comorbidities Affecting Functional Performance:     []Anxiety (F41.9)/Depression (F32.9)   []Diabetes Type 1(E10.65) or 2 (E11.65)   []Rheumatoid Arthritis (M05.9)  []Fibromyalgia (M79.7)  []Neuropathy(G60.9)  []Osteoarthritis(M19.91)  [x]None   []Other:    Visit # Insurance Allowable Auth Required   14  []  Yes []  No    From 19  to 2019    Date of Injury: progresive   Date of Surgery: 19    Date of Patient follow up with Physician: 19    RESTRICTIONS/PRECAUTIONS: no lifting     Latex Allergy:  [x]No      []Yes  Pacemaker:  [x] No       [] Yes     Preferred Language for Healthcare:   [x]English       []other:     Functional Scale: 80% (Quick DASH)   Date assessed:  2019    SUBJECTIVE: Doing well. Hoping to get cleared to play golf on Friday         Pain Scale:0- 1/10    OBJECTIVE:   Date:  Hand Dominance:     [x]? Right    []?  Left 2019    11/19/19  12/3/19  12/10/19  12/12/19  12/24/19  12/31/19    Objective Measures:           PAIN 6-8/10 2/10  1/10     0-1/10   Quick DASH 80%    23%     Digit  ROM         Index       MP:  PIP:  DIP:                                    Long MP:  PIP:  DIP:                                    Ring MP:  PIP:  DIP:                                    Small MP:  PIP:  DIP:         Digits tip to DPFC in cm Index:  Long:  Ring:  Small:         Thumb ROM MP  IP         Thumb opposition  R:  L:         Thumb Radial/Palmar abd ROM R:  L:         Wrist ROM Ext/Flex R: 55/50  L: 60/65         Rad/Uln dev ROM R:  L:         Forearm ROM  Sup/pron R:  L:         Elbow ROM Ext/flex R: 30/118  L: 0/140  R: 18/130  R: 10/140  L: 10/140  R: WNL's WNL's     Shoulder Flex  Shoulder Abd  Shoulder IR/ER           Edema in cm circumf.   MCPJs R:  L:         Edema in cm circumf.  elbow R: 17.5  L: 17           strength in lbs R:  L:    R: 70#  L: 91# R: 86# R: 90#   Pinch Strengthin lbs: lat  R:  L:         Pinch Strength in lbs:  3 point R:  L:            MMT:            Observations:  (including splints, bandages, incisions, scars): Steri strips in place                   MODALITIES: 11/12/19  12/5/19  12/10/19  12/12/19  12/17/19  12/19/19  12/24/19  12/31/19    Fluidotherapy (92095)           Estim (71887/80207) IFC and CP           Paraffin (06608)           US (50716)  8' 8' lateral elbow  8' lat elbow  8' lateral elbow  Same  Same  Same    Iontophoresis (09729)           Hot Pack  8' 10' 10' 10' Same  Same  8'   Cold Pack                      INTERVENTIONS:           Therapeutic Exercise (91954) AROM elbow, wrist and hand see media for HEP     Education and precautions  Forearm stretch modified x 10               X 10       4# x 5'                  5'    Triceps stretch x 10        X 10                 X 10       5# x 5'            Red x 20     S/p 2 wts x 10 x 2  X 10                 X 10       5# x 5'            Red x 20     Bicep and tricep with orange band 15 x 2  X 10                       6#            Green x 20             Power  #3 x 20  X 10 work    Home Exercise Program:    [x] (71181) Reviewed/Progressed HEP activities related to strengthening, flexibility, endurance, ROM of scapular, scapulothoracic and UE control with self care, reaching, carrying, lifting, house/yardwork, driving/computer work  [] (94306) Reviewed/Progressed HEP activities related to improving balance, coordination, kinesthetic sense, posture, motor skill, proprioception of scapular, scapulothoracic and UE control with self care, reaching, carrying, lifting, house/yardwork, driving/computer work      Manual Treatments:  DTM and scar mass  10'  [x] (89882) Provided manual therapy to mobilize soft tissue/joints of cervical/CT, scapular GHJ and UE for the purpose of modulating pain, promoting relaxation,  increasing ROM, reducing/eliminating soft tissue swelling/inflammation/restriction, improving soft tissue extensibility and allowing for proper ROM for normal function with self care, reaching, carrying, lifting, house/yardwork, driving/computer work    ADL Training:  [] (23024) Provided self-care/home management training related to activities of daily living and compensatory training, and/or use of adaptive equipment      Charges:  Timed Code Treatment Minutes: 38   Total Treatment Minutes: 48   Worker's Comp: Time In/Time Out     [] EVAL (LOW) 10946 (typically 20 minutes face-to-face)    [] EVAL (MOD) 01161 (typically 30 minutes face-to-face)  [] EVAL (HIGH) [phone] (typically 45 minutes face-to-face)  [] OT Re-eval (67002)       [x] Chasity [phone]) x  1    [] TSSAW(67437)  [] NMR (31287) x      [] Estim (attended) (64307)   [x] Manual ([phone]) x 1     [x] US (30311)  [] TA (35457) x      [] Paraffin (30059)  [] ADL  ([phone]) x     [] Splint/L code:    [] Estim (unattended) ([phone])  [] Fluidotherapy (30129)  [] Other:    ASSESSMENT:      GOALS:  Patient stated goal: play golf     []? Progressing: []? Met: []? Not Met: []? Adjusted     Therapist goals for Patient:   Short Term Goals:  To be continued skilled intervention: [x] Yes  [] No    Treatment/Activity Tolerance:  [x] Patient able to complete treatment  [] Patient limited by fatigue  [] Patient limited by pain    [] Patient limited by other medical complications  [] Other:                  PLAN: See eval  [x] Continue per plan of care [] Alter current plan (see comments above)  [] Plan of care initiated [] Hold pending MD visit [] Discharge      Electronically signed by:  Yasmine Nathan OTR/L [id number]    Note: If patient does not return for scheduled/ recommended follow up visits, this note will serve as a discharge from care along with most recent update on progress.

## 2020-01-03 ENCOUNTER — HOSPITAL ENCOUNTER (OUTPATIENT)
Dept: OCCUPATIONAL THERAPY | Age: 45
Setting detail: THERAPIES SERIES
Discharge: HOME | End: 2020-01-03
Payer: COMMERCIAL

## 2020-01-03 ENCOUNTER — OFFICE VISIT (OUTPATIENT)
Dept: ORTHOPEDIC SURGERY | Age: 45
End: 2020-01-03

## 2020-01-03 VITALS — BODY MASS INDEX: 24.51 KG/M2 | WEIGHT: 175.04 LBS | HEIGHT: 71 IN

## 2020-01-03 PROCEDURE — 97140 MANUAL THERAPY 1/> REGIONS: CPT | Performed by: OCCUPATIONAL THERAPIST

## 2020-01-03 PROCEDURE — 97035 APP MDLTY 1+ULTRASOUND EA 15: CPT | Performed by: OCCUPATIONAL THERAPIST

## 2020-01-03 PROCEDURE — 99024 POSTOP FOLLOW-UP VISIT: CPT | Performed by: ORTHOPAEDIC SURGERY

## 2020-01-03 PROCEDURE — 97110 THERAPEUTIC EXERCISES: CPT | Performed by: OCCUPATIONAL THERAPIST

## 2020-01-07 ENCOUNTER — HOSPITAL ENCOUNTER (OUTPATIENT)
Dept: OCCUPATIONAL THERAPY | Age: 45
Setting detail: THERAPIES SERIES
Discharge: HOME | End: 2020-01-07
Payer: COMMERCIAL

## 2020-01-07 PROCEDURE — 97035 APP MDLTY 1+ULTRASOUND EA 15: CPT | Performed by: OCCUPATIONAL THERAPIST

## 2020-01-07 PROCEDURE — 97110 THERAPEUTIC EXERCISES: CPT | Performed by: OCCUPATIONAL THERAPIST

## 2020-01-07 PROCEDURE — 97140 MANUAL THERAPY 1/> REGIONS: CPT | Performed by: OCCUPATIONAL THERAPIST

## 2020-01-07 NOTE — FLOWSHEET NOTE
[address] [facility], [address] [place] [address] Venu Hoffmanin  Phone: [phone]  Fax [phone]    Occupational Therapy Treatment Note/ Progress Report:     Is this a Progress Report:     []  Yes  [x]  No      If Yes:  Date Range for reporting period:  Beginning 19  Ending  Progress report will be due (10 Rx or 30 days whichever is less):     Recertification will be due (POC Duration  / 90 days whichever is less): 19   Date:  2020  Patient Name:  Tim Goldberg    :  1975  MRN: [id number]  Medical/Treatment Diagnosis Information:  · Diagnosis: R lateral epicondylar release Treatment dx: R elbow stiffness M25.621   ·       Insurance/Certification information:   [facility]  Physician Information:  Referring Practitioner: Dr. Nallely Blackman   Has the plan of care been signed (Y/N):        []  Yes  [x]  No   Comorbidities Affecting Functional Performance:     []Anxiety (F41.9)/Depression (F32.9)   []Diabetes Type 1(E10.65) or 2 (E11.65)   []Rheumatoid Arthritis (M05.9)  []Fibromyalgia (M79.7)  []Neuropathy(G60.9)  []Osteoarthritis(M19.91)  [x]None   []Other:    Visit # Insurance Allowable Auth Required   16  []  Yes []  No    From 19  to 2020    Date of Injury: progresive   Date of Surgery: 19    Date of Patient follow up with Physician: 19    RESTRICTIONS/PRECAUTIONS: no lifting     Latex Allergy:  [x]No      []Yes  Pacemaker:  [x] No       [] Yes     Preferred Language for Healthcare:   [x]English       []other:     Functional Scale: 80% (Quick DASH)   Date assessed:  2020    SUBJECTIVE:  I think I'm ready to golf           Pain Scale:0- 1/10    OBJECTIVE:   Date:  Hand Dominance:     [x]? Right    []?  Left 2019    11/19/19  12/3/19  12/10/19  12/12/19  12/24/19  12/31/19  1/7/20    Objective Measures:            PAIN 6-8/10 2/10  1/10     0-1/10 010    Quick DASH 80%    23%   0% Digit  ROM         Index       MP:  PIP:  DIP:                                     Long MP:  PIP:  DIP:                                     Ring MP:  PIP:  DIP:                                     Small MP:  PIP:  DIP:          Digits tip to DPFC in cm Index:  Long:  Ring:  Small:          Thumb ROM MP  IP          Thumb opposition  R:  L:          Thumb Radial/Palmar abd ROM R:  L:          Wrist ROM Ext/Flex R: 55/50  L: 60/65          Rad/Uln dev ROM R:  L:          Forearm ROM  Sup/pron R:  L:          Elbow ROM Ext/flex R: 30/118  L: 0/140  R: 18/130  R: 10/140  L: 10/140  R: WNL's WNL's      Shoulder Flex  Shoulder Abd  Shoulder IR/ER            Edema in cm circumf.   MCPJs R:  L:          Edema in cm circumf.  elbow R: 17.5  L: 17            strength in lbs R:  L:    R: 70#  L: 91# R: 86# R: 90#    Pinch Strengthin lbs: lat  R:  L:          Pinch Strength in lbs:  3 point R:  L:             MMT:             Observations:  (including splints, bandages, incisions, scars): Steri strips in place                    MODALITIES: 11/12/19  12/5/19  12/10/19  12/12/19  12/17/19  12/19/19  12/24/19  12/31/19  1/3/20  1/7/20    Fluidotherapy (51242)             Estim (80351/07323) IFC and CP             Paraffin (08607)             US (44995)  8' 8' lateral elbow  8' lat elbow  8' lateral elbow  Same  Same  Same  Same  Same    Iontophoresis (48981)             Hot Pack  8' 10' 10' 10' Same  Same  8' 10'    Cold Pack                          INTERVENTIONS:             Therapeutic Exercise (34823) AROM elbow, wrist and hand see media for HEP     Education and precautions  Forearm stretch modified x 10               X 10       4# x 5'                  5'    Triceps stretch x 10        X 10                 X 10       5# x 5'            Red x 20     S/p 2 wts x 10 x 2  X 10                 X 10       5# x 5'            Red x 20     Bicep and tricep with orange band 15 x 2  X 10                       6#            Green x 20             Power  #3 x 20  X 10                       6#            Green x 20     S/p 2 wts x 20             Power  #3 x 20     Wall push up 15 x 2  X 10                       7# x 5'            Green x 20     3 wts x 15 x 2             #4 x 20       15 x 2  X 10                                   Green x 20                 #4 x 20       Mat push up 10 x 2     rebounder 8# X 10                                                         15 x 2    Triceps stretching x 10     Wrist curls 2# 15 x 2  X 10                                 Blue x 20       3 wts x 15 x 2     Mat push up 15 x 2                                                        Therapeutic Activity (72670)                                                                 Manual Therapy (86305)  DTM and scar mass  Same  Same  Same  Same   Same  DTM lateral elbow and triceps 10' . . DN see below  7' DTM lateral elbow and triceps 7', DN triceps and wrist extensors 7' see below    (IASTM, Dry Needling, manual mobilization)                          Neuromuscular Reeducation (95374)                                       ADL Training (74387)                                       HEP Training/Review See sheet(s)                                      Splinting             Lcode:             Orthotic Mgmt, Subsequent Enc (85179)             Orthotic Mgmt & Training (65581)                          Other:                                                                   Therapeutic Exercise & NMR:  [x] (14997) Provided verbal/tactile cueing for activities related to strengthening, flexibility, endurance, ROM  for improvements in scapular, scapulothoracic and UE control with self care, reaching, carrying, lifting, house/yardwork, driving/computer work.    [] (09199) Provided verbal/tactile cueing for activities related to improving balance, coordination, kinesthetic sense, posture, motor skill, proprioception  to assist with  scapular, scapulothoracic and UE control with self care, reaching, carrying, lifting, house/yardwork, driving/computer work. Therapeutic Activities & NMR:    [] (92363 or 80700) Provided verbal/tactile cueing for activities related to improving balance, coordination, kinesthetic sense, posture, motor skill, proprioception and motor activation to allow for proper function of scapular, scapulothoracic and UE control with self care, carrying, lifting, driving/computer work    Home Exercise Program:    [x] (29590) Reviewed/Progressed HEP activities related to strengthening, flexibility, endurance, ROM of scapular, scapulothoracic and UE control with self care, reaching, carrying, lifting, house/yardwork, driving/computer work  [] (97461) Reviewed/Progressed HEP activities related to improving balance, coordination, kinesthetic sense, posture, motor skill, proprioception of scapular, scapulothoracic and UE control with self care, reaching, carrying, lifting, house/yardwork, driving/computer work      Manual Treatments:  DTM and scar mass  10'  [x] (40202) Provided manual therapy to mobilize soft tissue/joints of cervical/CT, scapular GHJ and UE for the purpose of modulating pain, promoting relaxation,  increasing ROM, reducing/eliminating soft tissue swelling/inflammation/restriction, improving soft tissue extensibility and allowing for proper ROM for normal function with self care, reaching, carrying, lifting, house/yardwork, driving/computer work    Spoke with    regarding the use of Dry Needling     Dry needling manual therapy: consisted on the placement of 6 needles in the following muscles:  Threading to short and long head of triceps. X 3  Threading to all wrist extensors. x 3  A 50 mm needle was inserted, piston, rotated, and coned to produce intramuscular mobilization. These techniques were used to restore functional range of motion, reduce muscle spasm and induce healing in the corresponding musculature.  [phone] Technique was utilized today while applying Dry needling treatment. The treatment sites where cleaned with 70% solution of  isopropyl alcohol . The PT washed their hands and utilized treatment gloves along with hand  prior to inserting the needles. All needles where removed and discarded in the appropriate sharps container. MD has given verbal and/or written approval for this treatment. ADL Training:  [] (36550) Provided self-care/home management training related to activities of daily living and compensatory training, and/or use of adaptive equipment      Charges:  Timed Code Treatment Minutes: 38   Total Treatment Minutes: 48   Worker's Comp: Time In/Time Out     [] EVAL (LOW) 22 [id number] (typically 20 minutes face-to-face)    [] EVAL (MOD) 45789 (typically 30 minutes face-to-face)  [] EVAL (HIGH) 64632 (typically 45 minutes face-to-face)  [] OT Re-eval (29455)       [x] Chasity [phone]) x  2    [] ODRTA(13152)  [] NMR (07519) x      [] Estim (attended) (07634)   [x] Manual ([id number]) x 1     [x] US (05536)  [] TA (42608) x      [] Paraffin (78038)  [] ADL  (78216) x     [] Splint/L code:    [] Estim (unattended) (22 538456)  [] Fluidotherapy (08420)  [] Other:    ASSESSMENT:      GOALS:  Patient stated goal: play golf     []? Progressing: [x]? Met: []? Not Met: []? Adjusted     Therapist goals for Patient:   Short Term Goals: To be achieved in: 2 weeks  1. Independent in HEP and progression per patient tolerance, in order to prevent re-injury. [x]? Progressing: [x]? Met: []? Not Met: []? Adjusted   2. Patient will have a decrease in pain to facilitate improvement in movement, function, and ADLs as indicated by Functional Deficits. [x]? Progressing: [x]? Met: []? Not Met: []?  Adjusted     Long Term Goals to be achieved in 10 weeks (through 1/25/19), including patient directed goals to address patient identified performance deficits:  1) Pt to be independent in graded HEP progression with a good level of

## 2020-01-07 NOTE — DISCHARGE SUMMARY
VKRO MP:  PIP:  DIP:                                            Small MP:  PIP:  DIP:                 Digits tip to DPFC in cm Index:  Long:  Ring:  Small:                 Thumb ROM MP  IP                 Thumb opposition  R:  L:                 Thumb Radial/Palmar abd ROM R:  L:                 Wrist ROM Ext/Flex R: 55/50  L: 60/65                 Rad/Uln dev ROM R:  L:                 Forearm ROM  Sup/pron R:  L:                 Elbow ROM Ext/flex R: 30/118  L: 0/140  R: 18/130  R: 10/140  L: 10/140  R: WNL's WNL's         Shoulder Flex  Shoulder Abd  Shoulder IR/ER                   Edema in cm circumf. MCPJs R:  L:                 Edema in cm circumf.  elbow R: 17.5  L: 17                   strength in lbs R:  L:       R: 70#  L: 91# R: 86# R: 90#     Pinch Strengthin lbs: lat  R:  L:                 Pinch Strength in lbs:  3 point R:  L:                    MMT:                    Observations:  (including splints, bandages, incisions, scars): Steri strips in place                            Treatment to date:  [x] Therapeutic Exercise    [x] Modalities:  [] Therapeutic Activity             [] Ultrasound  [] Electrical Stimulation  [] Gait Training     [] Cervical Traction [] Lumbar Traction  [x] Neuromuscular Re-education [] Cold/hotpack [] Iontophoresis  [x] Instruction in HEP      Other:  [x] Manual Therapy                   []                       ?           []   Assessment:  [x] All Goals were achieved.   [] The following goals were achieved (#'s):  [] The following goals were not achieved for the following reasons:/assessmen of improvement as it relates to each goal:    Plan of Care:  [x] Discharge from Therapy Services due to:    Reason for Discharge:   [x] All goals achieved    [] Patient having surgery  [] Physician discontinued therapy  [] Insurance/Financial Limitations [] Patient did not return for follow up visits [] Home program/1 visit only   [] No subjective or

## 2020-01-17 ENCOUNTER — APPOINTMENT (OUTPATIENT)
Dept: OCCUPATIONAL THERAPY | Age: 45
End: 2020-01-17
Payer: COMMERCIAL

## 2020-04-03 ENCOUNTER — OFFICE VISIT (OUTPATIENT)
Dept: ORTHOPEDIC SURGERY | Age: 45
End: 2020-04-03
Payer: COMMERCIAL

## 2020-04-03 VITALS — BODY MASS INDEX: 24.51 KG/M2 | WEIGHT: 175.04 LBS | HEIGHT: 71 IN

## 2020-04-03 PROCEDURE — 99212 OFFICE O/P EST SF 10 MIN: CPT | Performed by: ORTHOPAEDIC SURGERY

## 2020-04-03 NOTE — PROGRESS NOTES
Chief Complaint   Patient presents with    Follow-up     Right elbow lateral epicondylitis debridement and tendon repair sx 11/8/2019       HISTORY OF PRESENT ILLNESS:  Hari Flores is a 39 y.o.  patient here for repeat evaluation of his right elbow, now 5 months following lateral epicondyle tendon repair and reinsertion. He feels an occasional click when pressing on the surgical site proximally and flexing and extending his elbow. He denies locking. Overall his pain is much improved than before surgery. No numbness reported. ROS:  ROS neg     Past medical history is reviewed again today, no changes to report    PHYSICAL EXAMINATION:  Patient is alert and pleasant, in no acute distress. The affected extremity is examined today. Right elbow reveals well-healed surgical site. No swelling or deformity. Full elbow motion. No crepitance of the joint or instability. Intact neurovascular exam right hand. When palpating the proximal scar area and moving the elbow, there appears to be some surgical scar that is creating a slight area of click or crepitance, potentially even the site of the repair suture. X-rays: None today    IMPRESSION AND PLAN: Right elbow lateral epicondylitis  Doing well, reassurance provided. We will continue with our current care plan. Progressive activities as he is without pain. I would recommend spot treatment massage of the area to help calm down the postoperative scar area and help resolve the suture. I suspect this will go away with time. He is going to come back in the next 4 to 6 months for a final checkup unless needed sooner. All questions and concerns were addressed today. Patient is in agreement with the plan. Jackie Jean-Baptiste MD  Hand & Upper Extremity Surgery  [facility]  A partner of [facility] ([facility])        Please note that this transcription was created using voice recognition software.   Any errors are unintentional

## 2020-06-05 ENCOUNTER — OFFICE VISIT (OUTPATIENT)
Dept: ORTHOPEDIC SURGERY | Age: 45
End: 2020-06-05
Payer: COMMERCIAL

## 2020-06-05 VITALS — WEIGHT: 175.04 LBS | HEIGHT: 71 IN | BODY MASS INDEX: 24.51 KG/M2

## 2020-06-05 PROCEDURE — 99213 OFFICE O/P EST LOW 20 MIN: CPT | Performed by: ORTHOPAEDIC SURGERY

## 2020-06-05 PROCEDURE — 20550 NJX 1 TENDON SHEATH/LIGAMENT: CPT | Performed by: ORTHOPAEDIC SURGERY

## 2020-06-05 RX ORDER — BETAMETHASONE SODIUM PHOSPHATE AND BETAMETHASONE ACETATE 3; 3 MG/ML; MG/ML
12 INJECTION, SUSPENSION INTRA-ARTICULAR; INTRALESIONAL; INTRAMUSCULAR; SOFT TISSUE ONCE
Status: COMPLETED | OUTPATIENT
Start: 2020-06-05 | End: 2020-06-05

## 2020-06-05 RX ORDER — LIDOCAINE HYDROCHLORIDE 10 MG/ML
20 INJECTION, SOLUTION INFILTRATION; PERINEURAL ONCE
Status: COMPLETED | OUTPATIENT
Start: 2020-06-05 | End: 2020-06-05

## 2020-06-05 RX ADMIN — BETAMETHASONE SODIUM PHOSPHATE AND BETAMETHASONE ACETATE 12 MG: 3; 3 INJECTION, SUSPENSION INTRA-ARTICULAR; INTRALESIONAL; INTRAMUSCULAR; SOFT TISSUE at 09:15

## 2020-06-05 RX ADMIN — LIDOCAINE HYDROCHLORIDE 20 ML: 10 INJECTION, SOLUTION INFILTRATION; PERINEURAL at 09:15

## 2021-01-14 ENCOUNTER — TELEPHONE (OUTPATIENT)
Dept: ORTHOPEDIC SURGERY | Age: 46
End: 2021-01-14

## 2021-01-14 NOTE — TELEPHONE ENCOUNTER
RETURNED PATIENTS CALL AND L/M WILL NEED TO CALL AND MAKE AN APPOINTMENT IN OFFICE WITH DR. Taryn Sanon

## 2021-01-18 ENCOUNTER — OFFICE VISIT (OUTPATIENT)
Dept: ORTHOPEDIC SURGERY | Age: 46
End: 2021-01-18
Payer: COMMERCIAL

## 2021-01-18 VITALS — WEIGHT: 175 LBS | HEIGHT: 71 IN | BODY MASS INDEX: 24.5 KG/M2

## 2021-01-18 DIAGNOSIS — M54.2 NECK PAIN: Primary | ICD-10-CM

## 2021-01-18 PROCEDURE — 99204 OFFICE O/P NEW MOD 45 MIN: CPT | Performed by: PHYSICAL MEDICINE & REHABILITATION

## 2021-01-18 RX ORDER — MELOXICAM 15 MG/1
15 TABLET ORAL DAILY
Qty: 30 TABLET | Refills: 1 | Status: SHIPPED | OUTPATIENT
Start: 2021-01-18

## 2021-01-18 RX ORDER — METHOCARBAMOL 750 MG/1
750 TABLET, FILM COATED ORAL 3 TIMES DAILY
Qty: 90 TABLET | Refills: 0 | Status: SHIPPED | OUTPATIENT
Start: 2021-01-18 | End: 2021-02-17

## 2021-01-18 RX ORDER — PREDNISONE 10 MG/1
TABLET ORAL
Qty: 26 TABLET | Refills: 0 | Status: SHIPPED | OUTPATIENT
Start: 2021-01-18

## 2021-01-18 NOTE — PROGRESS NOTES
New Patient: SPINE    CHIEF COMPLAINT:    Chief Complaint   Patient presents with    Neck Pain     neck pain x4 weeks, no known injury       HISTORY OF PRESENT ILLNESS:                The patient is a 39 y.o. male [address] [facility] [place] [employer] self-referred for neck pain. I saw him 15 years ago for cervical HNP and epidurals. He did well. 1 month ago he was simply running on a treadmill and developed left neck pain. Initially quite severe with inability to turn his head to the left. Pain is improving but he is concerned symptoms are still present after 4 weeks. He has no numbness tingling or weakness. No fevers chills or night sweats. No trauma or falls. He rates his neck pain 7/10    Sent treatments include ice. Past treatments include physical therapy and cervical epidurals in the past    No past medical history on file. Pain Assessment  Location of Pain: Neck  Location Modifiers: Left  Severity of Pain: 7  Quality of Pain: Aching  Duration of Pain: Persistent  Frequency of Pain: Intermittent  Aggravating Factors: Bending, Stretching, Straightening, Other (Comment)  Limiting Behavior: No  Relieving Factors: Rest  Result of Injury: No  Work-Related Injury: No  Are there other pain locations you wish to document?: No    The pain assessment was noted & reviewed in the medical record today. Current/Past Treatment:   · Physical Therapy:   · Chiropractic:     · Injection:     Medications:            NSAIDS:             Muscle relaxer:              Steriods:              Neuropathic medications:              Opioids:            Other:   · Surgery/Consult:    Work Status/Functionality:     Past Medical History: Medical history form was reviewed today & scanned into the media tab  No past medical history on file.    Past Surgical History:     Past Surgical History:   Procedure Laterality Date    ELBOW FRACTURE SURGERY Left 2/28/2019    LEFT ELBOW LATERAL EPICONDYLITIS DEBRIDEMENT AND TENDON REPAIR performed by Lupe Pleitez MD at [address] Right 11/8/2019    RIGHT ELBOW LATERAL EPICONDYLITIS DEBRIDEMENT AND TENDON REPAIR performed by Lupe Pleitez MD at [place] [address]     Current Medications:   No current outpatient medications on file. Allergies:  Patient has no known allergies. Social History:    reports that he has never smoked. He has never used smokeless tobacco. He reports current alcohol use. Family History:   No family history on file. REVIEW OF SYSTEMS: Full ROS noted & scanned   CONSTITUTIONAL: Denies unexplained weight loss, fevers, chills or fatigue  NEUROLOGICAL: Denies unsteady gait or progressive weakness  MUSCULOSKELETAL: Denies joint swelling or redness  PSYCHOLOGICAL: Denies anxiety, depression   SKIN: Denies skin changes, delayed healing, rash, itching   HEMATOLOGIC: Denies easy bleeding or bruising  ENDOCRINE: Denies excessive thirst, urination, heat/cold  RESPIRATORY: Denies current dyspnea, cough  GI: Denies nausea, vomiting, diarrhea   : Denies bowel or bladder issues       PHYSICAL EXAM:    Vitals: Height 5' 10.98\" (1.803 m), weight 175 lb (79.4 kg). GENERAL EXAM:  · General Apparence: Patient is adequately groomed with no evidence of malnutrition. · Orientation: The patient is oriented to time, place and person. · Mood & Affect:The patient's mood and affect are appropriate   · Vascular: Examination reveals no swelling tenderness in upper or lower extremities. Good capillary refill  · Lymphatic: The lymphatic examination bilaterally reveals all areas to be without enlargement or induration  · Sensation: Sensation is intact without deficit  · Coordination/Balance: Good coordination     CERVICAL EXAMINATION:  · Inspection: Local inspection shows no step-off or bruising.   Cervical alignment is normal.     · Palpation: Points his left C7 deep posterior lateral spine area but is nontender to palpation  ·  Range of Motion: Mild loss of rotation to the left  · Strength: 5/5 bilateral upper extremities   · Special Tests:    ·   Spurling's, L'Hermitte's & Tillman's negative bilaterally. ·   Gastelum and Impingement tests are negative bilaterally. ·  Cubital and Carpal tunnel Tinel's negative bilaterally. · Skin:There are no rashes, ulcerations or lesions in right & left upper extremities. · Reflexes: Bilaterally triceps, biceps and brachioradialis are trace clonus absent bilaterally at the feet. · Additional Examinations:       · RIGHT UPPER EXTREMITY:  Inspection/examination of the right upper extremity does not show any tenderness, deformity or injury. Range of motion is full. There is no gross instability. There are no rashes, ulcerations or lesions. Strength and tone are normal.  · LEFT UPPER EXTREMITY: Inspection/examination of the left upper extremity does not show any tenderness, deformity or injury. Range of motion is full. There is no gross instability. There are no rashes, ulcerations or lesions. Strength and tone are normal.    LUMBAR/SACRAL EXAMINATION:  · Inspection: Local inspection shows no step-off or bruising. Lumbar alignment is normal.  Sagittal and Coronal balance is neutral.      · Palpation:   No evidence of tenderness at the midline. No tenderness bilaterally at the paraspinal or trochanters. There is no step-off or paraspinal spasm. ·   · Strength:   Strength testing is 5/5 in all muscle groups tested. · Special Tests:   Straight leg raise and crossed SLR negative. Leg length and pelvis level.  0 out of 5 Lloyd's signs. · Skin: There are no rashes, ulcerations or lesions. · Reflexes: Reflexes are symmetrically 2+ at the patellar and ankle tendons. Clonus absent bilaterally at the feet. ·   · Additional Examinations:   · RIGHT LOWER EXTREMITY: Inspection/examination of the right lower extremity does not show any tenderness, deformity or injury. Range of motion is full.  There is no gross instability. There are no rashes, ulcerations or lesions. Strength and tone are normal.  ·   · LEFT LOWER EXTREMITY:  Inspection/examination of the left lower extremity does not show any tenderness, deformity or injury. Range of motion is full. There is no gross instability. There are no rashes, ulcerations or lesions. Strength and tone are normal.    Diagnostic Testing:      Recall x-rays show C5-6 discogenic spondylosis, mild lower cervical facet arthropathy    Impression:    Left cervical strain x4 weeks        Plan:     He may have mild aggravation of his lower cervical spondylosis    I recommend a course of meloxicam and Robaxin. If no better in a week then Pred taper.      HEP instructions given    Will call 4 weeks if symptoms persist    BETSY Guillory

## 2021-04-10 NOTE — FLOWSHEET NOTE
[address] [facility], [address] [place] [address] [facility]  Phone: [phone]  Fax [phone]    Occupational Therapy Treatment Note/ Progress Report:     Is this a Progress Report:     []  Yes  [x]  No      If Yes:  Date Range for reporting period:  Beginning 19  Ending  Progress report will be due (10 Rx or 30 days whichever is less):     Recertification will be due (POC Duration  / 90 days whichever is less): 19   Date:  1/3/2020  Patient Name:  Alveda Mohs    :  1975  MRN: [id number]  Medical/Treatment Diagnosis Information:  · Diagnosis: R lateral epicondylar release Treatment dx: R elbow stiffness M25.621   ·       Insurance/Certification information:   [facility]  Physician Information:  Referring Practitioner: Dr. Anamaria Sanchez   Has the plan of care been signed (Y/N):        []  Yes  [x]  No   Comorbidities Affecting Functional Performance:     []Anxiety (F41.9)/Depression (F32.9)   []Diabetes Type 1(E10.65) or 2 (E11.65)   []Rheumatoid Arthritis (M05.9)  []Fibromyalgia (M79.7)  []Neuropathy(G60.9)  []Osteoarthritis(M19.91)  [x]None   []Other:    Visit # Insurance Allowable Auth Required   15  []  Yes []  No    From 19  to 1/3/2020    Date of Injury: progresive   Date of Surgery: 19    Date of Patient follow up with Physician: 19    RESTRICTIONS/PRECAUTIONS: no lifting     Latex Allergy:  [x]No      []Yes  Pacemaker:  [x] No       [] Yes     Preferred Language for Healthcare:   [x]English       []other:     Functional Scale: 80% (Quick DASH)   Date assessed:  1/3/2020    SUBJECTIVE: I saw the doctor, He was pleased and said I could progress act as tolerated. Pain Scale:0- 1/10    OBJECTIVE:   Date:  Hand Dominance:     [x]? Right    []?  Left 2019    11/19/19  12/3/19  12/10/19  12/12/19  12/24/19  12/31/19    Objective Measures:           PAIN 6-8/10 2/10  1/10     0-1/10 Power  #3 x 20  X 10                       6#            Green x 20     S/p 2 wts x 20             Power  #3 x 20     Wall push up 15 x 2  X 10                       7# x 5'            Green x 20     3 wts x 15 x 2             #4 x 20       15 x 2  X 10                                   Green x 20                 #4 x 20       Mat push up 10 x 2     rebounder 8# X 10                                                         15 x 2    Triceps stretching x 10     Wrist curls 2# 15 x 2                                                    Therapeutic Activity (89825)                                                            Manual Therapy (29764)  DTM and scar mass  Same  Same  Same  Same   Same  DTM lateral elbow and triceps 10' . . DN see below  7'   (IASTM, Dry Needling, manual mobilization)                        Neuromuscular Reeducation (20415)                                    ADL Training (45175)                                    HEP Training/Review See sheet(s)                                   Splinting            Lcode:            Orthotic Mgmt, Subsequent Enc (70813)            Orthotic Mgmt & Training (09756)                        Other:                                                              Therapeutic Exercise & NMR:  [x] (65355) Provided verbal/tactile cueing for activities related to strengthening, flexibility, endurance, ROM  for improvements in scapular, scapulothoracic and UE control with self care, reaching, carrying, lifting, house/yardwork, driving/computer work.    [] (77271) Provided verbal/tactile cueing for activities related to improving balance, coordination, kinesthetic sense, posture, motor skill, proprioception  to assist with  scapular, scapulothoracic and UE control with self care, reaching, carrying, lifting, house/yardwork, driving/computer work.     Therapeutic Activities & NMR:    [] (82077 or 09550) Provided verbal/tactile cueing for activities related to denies pain/discomfort

## 2021-04-12 ENCOUNTER — OFFICE VISIT (OUTPATIENT)
Dept: ORTHOPEDIC SURGERY | Age: 46
End: 2021-04-12
Payer: COMMERCIAL

## 2021-04-12 VITALS — HEIGHT: 70 IN | BODY MASS INDEX: 25.05 KG/M2 | WEIGHT: 175 LBS

## 2021-04-12 DIAGNOSIS — M25.512 LEFT SHOULDER PAIN, UNSPECIFIED CHRONICITY: ICD-10-CM

## 2021-04-12 DIAGNOSIS — M75.82 TENDINITIS OF LEFT ROTATOR CUFF: Primary | ICD-10-CM

## 2021-04-12 PROCEDURE — 20610 DRAIN/INJ JOINT/BURSA W/O US: CPT | Performed by: ORTHOPAEDIC SURGERY

## 2021-04-12 PROCEDURE — 99203 OFFICE O/P NEW LOW 30 MIN: CPT | Performed by: ORTHOPAEDIC SURGERY

## 2021-04-12 RX ORDER — METHYLPREDNISOLONE ACETATE 40 MG/ML
40 INJECTION, SUSPENSION INTRA-ARTICULAR; INTRALESIONAL; INTRAMUSCULAR; SOFT TISSUE ONCE
Status: COMPLETED | OUTPATIENT
Start: 2021-04-12 | End: 2021-04-12

## 2021-04-12 RX ORDER — ROPIVACAINE HYDROCHLORIDE 5 MG/ML
30 INJECTION, SOLUTION EPIDURAL; INFILTRATION; PERINEURAL ONCE
Status: COMPLETED | OUTPATIENT
Start: 2021-04-12 | End: 2021-04-12

## 2021-04-12 RX ADMIN — ROPIVACAINE HYDROCHLORIDE 30 ML: 5 INJECTION, SOLUTION EPIDURAL; INFILTRATION; PERINEURAL at 16:15

## 2021-04-12 RX ADMIN — METHYLPREDNISOLONE ACETATE 40 MG: 40 INJECTION, SUSPENSION INTRA-ARTICULAR; INTRALESIONAL; INTRAMUSCULAR; SOFT TISSUE at 16:14

## 2021-04-12 NOTE — PROGRESS NOTES
Administrations This Visit     methylPREDNISolone acetate (DEPO-MEDROL) injection 40 mg     Admin Date  04/12/2021  16:14 Action  Given Dose  40 mg Route  Intramuscular Site  Shoulder Left Administered By  Rolin Marking    Ordering Provider: Jonathan Herrera MD    NDC: 1787-0304-05    Lot#: [id number]    : Ree Avila     Patient Supplied?: No          ropivacaine (NAROPIN) 0.5% injection 30 mL     Admin Date  04/12/2021  16:15 Action  Given Dose  30 mL Route  Epidural Site  Shoulder Left Administered By  Rolin Marking    Ordering Provider: Jonathan Herrera MD    [address]: [id number]    Lot#: [id number]    : [address] [facility]    Patient Supplied?: No

## 2021-04-12 NOTE — PROGRESS NOTES
[facility]  Office Visit  New Patient  Date:  2021    Name:  Venice Santoro  [place]  [place] [address]    :  1975      Age:   42 y.o.    SSN:  [id number]      Medical Record Number:  <[id number]>    Chief Complaint:     L shoulder pain     HPI:   Venice Santoro is a 42 y.o. male who is presenting for complaints of left shoulder pain that has been going on for couple of months. He has not had any associated injury or falls. Pain is limited to the anterolateral aspect of shoulder. He experiences pain at night and pain with overhead activities. He has been taking NSAIDs to provide some relief. He has history of C-spine herniated disc and history of epidurals in the past.  He was seen in January for a cervical paraspinal muscle strain that improved. The pain he is experiencing now is different from that. Is not having any associated numbness or tingling or any pain radiating down the arm. He denies any new numbness, tingling, fevers, chills, chest pain, shortness of breath, or any other new significant symptoms. Past History:  No past medical history on file. Past Surgical History:   Procedure Laterality Date    ELBOW FRACTURE SURGERY Left 2019    LEFT ELBOW LATERAL EPICONDYLITIS DEBRIDEMENT AND TENDON REPAIR performed by Dana Moseley MD at [address] [place] [address] Right 2019    RIGHT ELBOW LATERAL EPICONDYLITIS DEBRIDEMENT AND TENDON REPAIR performed by Dana Moseley MD at [place] [address]       Social History     Tobacco Use    Smoking status: Never Smoker    Smokeless tobacco: Never Used   Substance Use Topics    Alcohol use: Yes     Comment: occasionally    Drug use: Not on file        Family History:  family history is not on file.          Current Outpatient Medications:     meloxicam (MOBIC) 15 MG tablet, Take 1 tablet by mouth daily, Disp: 30 tablet, Rfl: 1    predniSONE (DELTASONE) 10 MG tablet, SIG: iii po BID x 2 days then ii po BID x 2 days then i po BID x 2 days then i po qd x 2 days, Disp: 26 tablet, Rfl: 0      No Known Allergies      Review of Systems: A 14 point review of systems was completed by the patient on 4/12/21 and is available in the media section of the scanned medical record and was reviewed today  The review is negative with the exception of those things mentioned in the HPI    No notes on file    Physical Exam:  There were no vitals taken for this visit. General: No acute distress, well nourished  CV: No obvious peripheral edema. Normal peripheral pulses  Resp: nonlabored respiration, symmetrical chest expansion  Neuro: Alert & oriented x 3  Psych: Appropriate mood and affect    L Shoulder Exam:  Inspection: No gross deformities, no signs of infection. Palpation: tender at biciptal groove and cuff footprint  Active Range of Motion: Forward Elevation 170, Abduction 170, External Rotation 50, Internal Rotation t8  Passive Range of Motion: No restrictions  Strength:  External Rotation 5/5, Internal Rotation 5/5  Special Tests:  No Sharif muscle deformity. 5/5 Geovanni test.   Neurovascular: Sensation to light touch is intact, no motor deficits, palpable radial pulses 2+      Contralateral R Shoulder Exam:  Inspection: No gross deformities, no signs of infection. Palpation: No tenderness to palpation  Active Range of Motion: Forward Elevation 170, Abduction 170, External Rotation 50, Internal Rotation t8  Passive Range of Motion: No restrictions  Strength:  External Rotation 5/5, Internal Rotation 5/5  Special Tests:  No Sharif muscle deformity. Neurovascular: Sensation to light touch is intact, no motor deficits, palpable radial pulses 2+      Radiographic:  X-rays obtained and reviewed in office, reviewed and interpreted by me today:  3 views of the left shoulder: No fractures. No dislocations. No high riding humerus noted. Assessment:  Abigail Garg is a 42 y.o. male RHD with L rotator cuff tendonitis, biceps tendinopathy, AC joint arthritis     Impression:  No diagnosis found. Office Procedures:  No orders of the defined types were placed in this encounter. Plan:   - Pertinent imaging was reviewed. The etiology, natural history, and treatment options for the disorder were discussed. The roles of activity modifications, medications, injections, physical therapy, and surgical interventions were all described to the patient and questions were answered. - L shoulder AC/subacromial injection provided today  - Continue meloxicam as needed  -Patient provided for physical therapy-he will be going to the [facility]  - he will return in 1 months time for follow up      Procedure: L Subacromial  Injection/AC joint injection  After discussion of the risks, benefits, alternatives to injection, patient agreeable. The skin was cleansed and prepped. Using a 25 gauge needle an injection with 2 mL of 40 mg/ml Depo-Medrol and 3 mL of ropivicaine was injected without difficulty into the subacromial space from a posterior approach. 4 cc were injected in subacromial space and 1 cc was injected into the [facility] joint through superior approach. Sterile dressing was applied. The patient tolerated the procedure well. Jeryl Gitelman, MD  Attestation:  I was physically present and performed my own examination of this patient and have discussed the case, including pertinent history and exam findings with the fellow. I agree with the documented assessment and plan. Byron Hurtado. Carlos Irvin MD  Orthopaedic Fellow  [facility] and [address] [place] [address]      The encounter with Abigail Garg was supervised by Dr Carlos Irvin who personally examined the patient and reviewed the plan. This dictation was performed with a verbal recognition program (DRAGON) and it was checked for errors.   It is possible that there are still dictated errors within this office note. If so, please bring any errors to my attention for an addendum. All efforts were made to ensure that this office note is accurate.

## 2021-05-04 ENCOUNTER — HOSPITAL ENCOUNTER (OUTPATIENT)
Dept: PHYSICAL THERAPY | Age: 46
Setting detail: THERAPIES SERIES
Discharge: HOME | End: 2021-05-04
Payer: COMMERCIAL

## 2021-05-04 PROCEDURE — 97110 THERAPEUTIC EXERCISES: CPT | Performed by: PHYSICAL THERAPIST

## 2021-05-04 PROCEDURE — 97161 PT EVAL LOW COMPLEX 20 MIN: CPT | Performed by: PHYSICAL THERAPIST

## 2021-05-04 NOTE — FLOWSHEET NOTE
[facility]    Physical Therapy Daily Treatment Note  Date:  2021    Patient Name:  Florencia Pollock    :  1975  MRN: [id number]  Restrictions/Precautions:    Medical/Treatment Diagnosis Information:  · Diagnosis: M75.82 (ICD-10-CM) - Tendinitis of left rotator cuff  · Treatment Diagnosis: R53.1, R13.942  Insurance/Certification information:  PT Insurance Information: PT BENEFITS  FACILITY/ ANTHEM/ [id number]/ ACTIVE/ DED 4000 MET / PAYS 100%/ OOP 4000 MET  VPCY HARD MAX/ 0 AUTH/  ALL CODES BILLABLE/ TELEHEALTH SAME BENEFIT/ CALEB REF# [id number]/ 5-3-2021 PAG  Physician Information:  Referring Practitioner: Skylar Schaefer.  Lorrain Cogan, MD  Has the plan of care been signed (Y/N):        []  Yes  [x]  No     Date of Patient follow up with Physician: 21      Is this a Progress Report:     []  Yes  [x]  No        If Yes:  Date Range for reporting period:  Beginning  Ending    Progress report will be due (10 Rx or 30 days whichever is less): 18       Recertification will be due (POC Duration  / 90 days whichever is less): 21         Visit # Insurance Allowable Auth Required   1 60 []  Yes []  No        Functional Scale:   Date assessed:   UEFI 72/80=90% (10% deficit) 21    Latex Allergy:  [x]NO      []YES  Preferred Language for Healthcare:   [x]English       []other:    Pain level:  5-6/10     SUBJECTIVE:  See eval    OBJECTIVE:              ROM PROM AROM  Comment     L R L R     Flexion     164 164     Abduction     175 180     ER     100 110     IR     33 50     Other  (cervical)             Other                 Strength L R Comment   Flexion 3+/5 p! 4/5     Abduction 3+/5 p! 4/5     ER 3+/5 p! 4/5     IR 3+/5 p! 4/5     Lower Trap 3+/5  4-/5     Mid Trap 3+/5  4/5     Biceps 4/5 p! 4+/5     Triceps 4/5 4+/5        Special Tests Results/Comment   IRRST + IR > ER   Jimenez +   Nhi + (31464) Provided verbal/tactile cueing for activities related to improving balance, coordination, kinesthetic sense, posture, motor skill, proprioception  to assist with  scapular, scapulothoracic and UE control with self care, reaching, carrying, lifting, house/yardwork, driving/computer work. Therapeutic Activities:    [] (96498 or 05825) Provided verbal/tactile cueing for activities related to improving balance, coordination, kinesthetic sense, posture, motor skill, proprioception and motor activation to allow for proper function of scapular, scapulothoracic and UE control with self care, carrying, lifting, driving/computer work. Home Exercise Program:    Access Code: [email or web address]  URL: [email or web address]  Date: 05/04/2021  Prepared by: Yair Hutchinson     Exercises  Prone Shoulder Extension - 1 x daily - 7 x weekly - 1 sets - 10 reps - 10 second hold  Sidelying Shoulder ER with Towel and Dumbbell - 1 x daily - 7 x weekly - 3 sets - 10 reps  Supine Scapular Protraction in Flexion with Dumbbells - 1 x daily - 7 x weekly - 3 sets - 10 reps  Shoulder External Rotation with Anchored Resistance - 1 x daily - 7 x weekly - 10 reps - 3 sets  Shoulder Internal Rotation with Resistance - 1 x daily - 7 x weekly - 10 reps - 3 sets     [x] (99714) Reviewed/Progressed HEP activities related to strengthening, flexibility, endurance, ROM of scapular, scapulothoracic and UE control with self care, reaching, carrying, lifting, house/yardwork, driving/computer work  [] (09095) Reviewed/Progressed HEP activities related to improving balance, coordination, kinesthetic sense, posture, motor skill, proprioception of scapular, scapulothoracic and UE control with self care, reaching, carrying, lifting, house/yardwork, driving/computer work      Manual Treatments:  [] (62395) Provided manual therapy to mobilize soft tissue/joints of cervical/CT, scapular GHJ and UE for the purpose of modulating pain, promoting relaxation, increasing ROM, reducing/eliminating soft tissue swelling/inflammation/restriction, improving soft tissue extensibility and allowing for proper ROM for normal function with self care, reaching, carrying, lifting, house/yardwork, driving/computer work    Modalities:  none    Charges:  Timed Code Treatment Minutes: 25   Total Treatment Minutes: 45   Time in: 4:15  Time out: 5:05    [x] EVAL (LOW) 76252 (typically 20 minutes face-to-face)  [] EVAL (MOD) 96805 (typically 30 minutes face-to-face)  [] EVAL (HIGH) 07573 (typically 45 minutes face-to-face)  [] RE-EVAL     [x] FD(72731) x 2    [] IONTO  [] NMR (36545) x     [] VASO  [] Manual (02906) x      [] Other:  [] TA x      [] Mech Traction (11153)  [] ES(attended) (99044)      [] ES (un) (50525):     GOALS:  Patient stated goal: improve strength and have less pain    []? Progressing: []? Met: []? Not Met: []? Adjusted     Therapist goals for Patient:   Short Term Goals: To be achieved in: 2 weeks 5/18/21  1. Independent in HEP and progression per patient tolerance, in order to prevent re-injury. []? Progressing: []? Met: []? Not Met: []? Adjusted   2. Patient will have a decrease in pain to facilitate improvement in movement, function, and ADLs as indicated by Functional Deficits as well as improved tolerance to sleeping.    []? Progressing: []? Met: []? Not Met: []? Adjusted     Long Term Goals: To be achieved in: 8 weeks 6/29/21  1. Disability index score of 5% or less for the UEFS to assist with reaching prior level of function. []? Progressing: []? Met: []? Not Met: []? Adjusted  2. Patient will demonstrate increased AROM shoulder flexion to 170 or greater to allow for proper joint functioning as indicated by patients Functional Deficits. []? Progressing: []? Met: []? Not Met: []? Adjusted  3.  Patient will demonstrate an increase in LUE RC and gallito-scap strength to 4/5 or greater to allow for proper functional mobility as indicated by patients Functional

## 2021-05-04 NOTE — PLAN OF CARE
[facility]      Physical Therapy Certification    Dear Referring Practitioner: Remberto Oleary. Antoine Murphy MD,    We had the pleasure of evaluating the following patient for physical therapy services at [address] [place] [address]. A summary of our findings can be found in the initial assessment below. This includes our plan of care. If you have any questions or concerns regarding these findings, please do not hesitate to contact me at the office phone number checked above. Thank you for the referral.       Physician Signature:_______________________________Date:__________________  By signing above (or electronic signature), therapists plan is approved by physician      Patient: Jayne Marrero   : 1975   MRN: [id number]  Referring Physician: Referring Practitioner: Remberto Oleary. Antoine Murphy MD      Evaluation Date: 2021      Medical Diagnosis Information:  Diagnosis: M75.82 (ICD-10-CM) - Tendinitis of left rotator cuff   Treatment Diagnosis: R53.1, M25.512                                         Insurance information: PT Insurance Information: PT BENEFITS  FACILITY/ [facility]/ [id number]/ ACTIVE/ DED 4000 MET / PAYS 100%/ OOP 4000 MET / 60 VPCY HARD MAX/ 0 AUTH/  ALL CODES BILLABLE/ TELEHEALTH SAME BENEFIT/ CALEB REF# [id number]/ 5-3-2021 PAG    Precautions/ Contra-indications:     C-SSRS Triggered by Intake questionnaire (Past 2 wk assessment):   [x] No, Questionnaire did not trigger screening.   [] Yes, Patient intake triggered further evaluation      [] C-SSRS Screening completed  [] PCP notified via Plan of Care  [] Emergency services notified     Latex Allergy:  [x]NO      []YES  Preferred Language for Healthcare:   [x]English       []other:    SUBJECTIVE: Patient stated complaint: Pt presents to PT with L shoulder pain. Pain began in December, no SARAH. Feels it may be related to playing a lot of golf. States that he has continued to play golf and does not feel restricted by it. Pt states that his pain is inconsistent. Driving with his L arm on top of the steering wheel, reaching across the body. Does have some discomfort when reaching behind his back. Pain is anterior in nature. Has not tried ice, heat or medication. Saw MD on 4/12/21 and was given 2 cortisone injections. Feels the injection helped a little bit but the pain has not gone away. Pt does have pain at night, unable to lay on his L shoulder d/t pain. + clicking    Hobbies: golf (anywhere from 1-3x/wk)    Pt has exercise equipment at home: weights (range from 2-25lbs) and bands    Relevant Medical History: see intake form; hx of cervical disc herniation, B elbow surgery (L was 3/2019)  Functional Disability Index: UEFI 72/80=90% (10% deficit    Pain Scale: 5-6/10, sharp in nature, feels deep in the joint  Easing factors: none that he can think of  Provocative factors: reaching across the back,     Type: []Constant   [x]Intermittent  []Radiating []Localized []other:      Numbness/Tingling: no    Occupation/School: Teacher, [employer]    Living Status/Prior Level of Function: Independent with ADLs and IADLs    OBJECTIVE:     ROM PROM AROM  Comment    L R L R    Flexion   164 164    Abduction   175 180    ER   100 110    IR   33 50    Other  (cervical)        Other           Strength L R Comment   Flexion 3+/5 p! 4/5    Abduction 3+/5 p! 4/5    ER 3+/5 p! 4/5    IR 3+/5 p! 4/5    Lower Trap 3+/5  4-/5    Mid Trap 3+/5  4/5    Biceps 4/5 p! 4+/5    Triceps 4/5 4+/5      Special Tests Results/Comment   IRRST + IR > ER   Gastelum-Erwin +   Neers +   Painful Arc -   Drop Arm -   ER Lag Sign -   Empty Can / Geovanni's -   Champagne Toast -   Belly press + for p! Speeds - for p!, + for weakness   OBriens - by definition of test, > p!  With thumb up   Cross body adduction +   Apprehension/Relocation Not assessed   Hornblower's -   Bicep Load II Not assessed Reflexes/Sensation:               [x]Dermatomes/Myotomes intact               []Reflexes equal and normal bilaterally               []Other:     Joint mobility:               []Normal               []Hypo              []Hyper     Palpation: no TTP anterior shoulder, RC insertion, posterior cuff, around scapula, UT/neck     Functional Mobility/Transfers: Independent     Posture: rounded shoulders                       [x] Patient history, allergies, meds reviewed. Medical chart reviewed. See intake form. Review Of Systems (ROS):  [x]Performed Review of systems (Integumentary, CardioPulmonary, Neurological) by intake and observation. Intake form has been scanned into medical record. Patient has been instructed to contact their primary care physician regarding ROS issues if not already being addressed at this time.        Co-morbidities/Complexities (which will affect course of rehabilitation):   []None              Arthritic conditions   []Rheumatoid arthritis (M05.9)  []Osteoarthritis (M19.91)    Cardiovascular conditions   []Hypertension (I10)  []Hyperlipidemia (E78.5)  []Angina pectoris (I20)  []Atherosclerosis (I70)    Musculoskeletal conditions   []Disc pathology   []Congenital spine pathologies   [x]Prior surgical intervention  []Osteoporosis (M81.8)  []Osteopenia (M85.8)   Endocrine conditions   []Hypothyroid (E03.9)  []Hyperthyroid Gastrointestinal conditions   []Constipation (H00.84)    Metabolic conditions   []Morbid obesity (E66.01)  []Diabetes type 1(E10.65) or 2 (E11.65)   []Neuropathy (G60.9)      Pulmonary conditions   []Asthma (J45)  []Coughing   []COPD (J44.9)    Psychological Disorders  []Anxiety (F41.9)  []Depression (F32.9)   []Other:    [x]Other:   herniated cervical disc         Barriers to/and or personal factors that will affect rehab potential:              []Age  []Sex              [x]Motivation                       []Co-Morbidities              []Cognitive Function, education/learning barriers              []Environmental, home barriers              []profession/work barriers  [x]past PT/medical experience  []other:  Justification: pt does have hx of cervical pain and B elbow surgery which could be related to or impacting gallito-scap and RC strength; however, these prior injuries are not bothersome at this time. Pt is motivated to get better. Falls Risk Assessment (30 days):   [x] Falls Risk assessed and no intervention required.   [] Falls Risk assessed and Patient requires intervention due to being higher risk   TUG score (>12s at risk):     [] Falls education provided, including      ASSESSMENT:   Functional Impairments              []Noted spinal or UE joint hypomobility              []Noted spinal or UE joint hypermobility              [x]Decreased UE functional ROM              [x]Decreased UE functional strength              []Abnormal reflexes/sensation/myotomal/dermatomal deficits              [x]Decreased RC/scapular/core strength and neuromuscular control              []other:       Functional Activity Limitations (from functional questionnaire and intake)              [x]Reduced ability to tolerate prolonged functional positions              []Reduced ability or difficulty with changes of positions or transfers between positions              []Reduced ability to maintain good posture and demonstrate good body mechanics with sitting, bending, and lifting              [] Reduced ability or tolerance with driving and/or computer work              [x]Reduced ability to sleep              [x]Reduced ability to perform lifting, reaching, carrying tasks              []Reduced ability to tolerate impact through UE              [x]Reduced ability to reach behind back              []Reduced ability to  or hold objects              []Reduced ability to throw or toss an object              []other:       Participation Restrictions              []Reduced participation in self care activities              [x]Reduced participation in home management activities              []Reduced participation in work activities              []Reduced participation in social activities. [x]Reduced participation in sport / recreational activities. Classification:              []Signs/symptoms consistent with post-surgical status including decreased ROM, strength and function.   []Signs/symptoms consistent with joint sprain/strain              [x]Signs/symptoms consistent with shoulder impingement              [x]Signs/symptoms consistent with shoulder/elbow/wrist tendinopathy              []Signs/symptoms consistent with Rotator cuff tear              []Signs/symptoms consistent with labral tear              []Signs/symptoms consistent with postural dysfunction                         []Signs/symptoms consistent with Glenohumeral IR Deficit - <45 degrees              []Signs/symptoms consistent with facet dysfunction of cervical/thoracic spine                         []Signs/symptoms consistent with pathology which may benefit from Dry needling                []other:      Prognosis/Rehab Potential:                                       []Excellent              [x]Good                 []Fair              []Poor     Tolerance of evaluation/treatment:               []Excellent              [x]Good                 []Fair              []Poor     Physical Therapy Evaluation Complexity Justification  [x] A history of present problem with:  [x] no personal factors and/or comorbidities that impact the plan of care;  []1-2 personal factors and/or comorbidities that impact the plan of care  []3 personal factors and/or comorbidities that impact the plan of care  [x] An examination of body systems using standardized tests and measures addressing any of the following: body structures and functions (impairments), activity limitations, and/or participation restrictions;:  [x] a total of 1-2 or more elements   [] a total of 3 or more elements   [x] a total of 4 or more elements   [x] A clinical presentation with:  [x] stable and/or uncomplicated characteristics   [] evolving clinical presentation with changing characteristics  [] unstable and unpredictable characteristics;   [x] Clinical decision making of [x] low, [] moderate, [] high complexity using standardized patient assessment instrument and/or measurable assessment of functional outcome. [x] EVAL (LOW) 09321 (typically 20 minutes face-to-face)  [] EVAL (MOD) 40679 (typically 30 minutes face-to-face)  [] EVAL (HIGH) 47537 (typically 45 minutes face-to-face)  [] RE-EVAL         PLAN:  Frequency/Duration:  1-2 days per week for 8 Weeks:  INTERVENTIONS:  [x] Therapeutic exercise including: strength training, ROM, for Upper extremity and core   [x]  NMR activation and proprioception for UE, scap and Core   [x] Manual therapy as indicated for shoulder, scapula and spine to include: Dry Needling/IASTM, STM, PROM, Gr I-IV mobilizations, manipulation. [x] Modalities as needed that may include: thermal agents, E-stim, Biofeedback, US, iontophoresis as indicated  [x] Patient education on joint protection, postural re-education, activity modification, progression of HEP. Access Code: [email or web address]  URL: [email or web address]  Date: 05/04/2021  Prepared by: Jim Sandra    Exercises  Prone Shoulder Extension - 1 x daily - 7 x weekly - 1 sets - 10 reps - 10 second hold  Sidelying Shoulder ER with Towel and Dumbbell - 1 x daily - 7 x weekly - 3 sets - 10 reps  Supine Scapular Protraction in Flexion with Dumbbells - 1 x daily - 7 x weekly - 3 sets - 10 reps  Shoulder External Rotation with Anchored Resistance - 1 x daily - 7 x weekly - 10 reps - 3 sets  Shoulder Internal Rotation with Resistance - 1 x daily - 7 x weekly - 10 reps - 3 sets      GOALS:   Patient stated goal: improve strength and have less pain    [] Progressing: [] Met: [] Not Met: [] Adjusted    Therapist goals for Patient:   Short Term Goals: To be achieved in: 2 weeks 5/18/21  1. Independent in HEP and progression per patient tolerance, in order to prevent re-injury. [] Progressing: [] Met: [] Not Met: [] Adjusted   2. Patient will have a decrease in pain to facilitate improvement in movement, function, and ADLs as indicated by Functional Deficits as well as improved tolerance to sleeping. [] Progressing: [] Met: [] Not Met: [] Adjusted    Long Term Goals: To be achieved in: 8 weeks 6/29/21  1. Disability index score of 5% or less for the UEFS to assist with reaching prior level of function. [] Progressing: [] Met: [] Not Met: [] Adjusted  2. Patient will demonstrate increased AROM shoulder flexion to 170 or greater to allow for proper joint functioning as indicated by patients Functional Deficits. [] Progressing: [] Met: [] Not Met: [] Adjusted  3. Patient will demonstrate an increase in LUE RC and gallito-scap strength to 4/5 or greater to allow for proper functional mobility as indicated by patients Functional Deficits. [] Progressing: [] Met: [] Not Met: [] Adjusted  4. Patient will return to sleeping, reaching across body and reaching behind back without increased symptoms or restriction. [] Progressing: [] Met: [] Not Met: [] Adjusted  5. Patient will be able to golf with 0/10 pain for 18 holes. [] Progressing: [] Met: [] Not Met: [] Adjusted    Electronically signed by:  Mehran Hernandez, PT, DPT, OCS  Physical Therapist  [id number]  [email or web address]      Note: If patient does not return for scheduled/ recommended follow up visits, this note will serve as a discharge from care along with most recent update on progress.

## 2021-05-12 ENCOUNTER — OFFICE VISIT (OUTPATIENT)
Dept: ORTHOPEDIC SURGERY | Age: 46
End: 2021-05-12
Payer: COMMERCIAL

## 2021-05-12 ENCOUNTER — HOSPITAL ENCOUNTER (OUTPATIENT)
Dept: PHYSICAL THERAPY | Age: 46
Setting detail: THERAPIES SERIES
Discharge: HOME | End: 2021-05-12
Payer: COMMERCIAL

## 2021-05-12 VITALS — TEMPERATURE: 98.6 F | BODY MASS INDEX: 25.05 KG/M2 | WEIGHT: 175 LBS | HEIGHT: 70 IN

## 2021-05-12 DIAGNOSIS — M75.82 TENDINITIS OF LEFT ROTATOR CUFF: Primary | ICD-10-CM

## 2021-05-12 DIAGNOSIS — M19.012 ARTHRITIS OF LEFT ACROMIOCLAVICULAR JOINT: ICD-10-CM

## 2021-05-12 DIAGNOSIS — M25.512 LEFT SHOULDER PAIN, UNSPECIFIED CHRONICITY: ICD-10-CM

## 2021-05-12 PROCEDURE — 99213 OFFICE O/P EST LOW 20 MIN: CPT | Performed by: ORTHOPAEDIC SURGERY

## 2021-05-12 PROCEDURE — 97110 THERAPEUTIC EXERCISES: CPT | Performed by: PHYSICAL THERAPIST

## 2021-05-12 PROCEDURE — 97112 NEUROMUSCULAR REEDUCATION: CPT | Performed by: PHYSICAL THERAPIST

## 2021-05-12 RX ORDER — DICLOFENAC SODIUM 75 MG/1
75 TABLET, DELAYED RELEASE ORAL 2 TIMES DAILY
Qty: 60 TABLET | Refills: 2 | Status: SHIPPED | OUTPATIENT
Start: 2021-05-12

## 2021-05-12 NOTE — FLOWSHEET NOTE
The [address] [place] [address] and Sports [facility]    Physical Therapy Daily Treatment Note  Date:  2021    Patient Name:  Tolu Beckford    :  1975  MRN: [id number]  Restrictions/Precautions:    Medical/Treatment Diagnosis Information:  · Diagnosis: M75.82 (ICD-10-CM) - Tendinitis of left rotator cuff  · Treatment Diagnosis: R53.1, F67.013  Insurance/Certification information:  PT Insurance Information: PT BENEFITS  FACILITY/ ANTHEM/ [id number]/ ACTIVE/ DED 4000 MET / PAYS 100%/ OOP 4000 MET  VPCY HARD MAX/ 0 AUTH/  ALL CODES BILLABLE/ TELEHEALTH SAME BENEFIT/ CALEB REF# [id number]/ 5-3-2021 PAG  Physician Information:  Referring Practitioner: Ezequiel Arora. Rachael Brar MD  Has the plan of care been signed (Y/N):        []  Yes  [x]  No     Date of Patient follow up with Physician: 21      Is this a Progress Report:     []  Yes  [x]  No        If Yes:  Date Range for reporting period:  Beginning  Ending    Progress report will be due (10 Rx or 30 days whichever is less): 4/3/08       Recertification will be due (POC Duration  / 90 days whichever is less): 21         Visit # Insurance Allowable Auth Required   2 60 []  Yes []  No        Functional Scale:   Date assessed:   UEFI 72/80=90% (10% deficit) 21    Latex Allergy:  [x]NO      []YES  Preferred Language for Healthcare:   [x]English       []other:    Pain level:  5-6/10     SUBJECTIVE:  Pt feeling a little improvement. Saw MD prior to PT session, was prescribed diclofenac. Pt note that's that he had to bring his arm OH and into ER during MD visit and it was very painful, soreness is lingering still. Pt states that his shoulder clicks every time with TB ER, but the click is not painful.      OBJECTIVE:              ROM PROM AROM  Comment     L R L R     Flexion     164 164     Abduction     175 180     ER     100 110     IR     33 50     Other  (cervical)             Other                 Strength L R Comment   Flexion 3+/5 p! 4/5     Abduction 3+/5 p! 4/5     ER 3+/5 p! 4/5     IR 3+/5 p! 4/5     Lower Trap 3+/5  4-/5     Mid Trap 3+/5  4/5     Biceps 4/5 p! 4+/5     Triceps 4/5 4+/5        Special Tests Results/Comment   IRRST + IR > ER   Gastelum-Erwin +   Neers +   Painful Arc -   Drop Arm -   ER Lag Sign -   Empty Can / Geovanni's -   Champagne Toast -   Belly press + for p! Speeds - for p!, + for weakness   OBriens - by definition of test, > p! With thumb up   Cross body adduction +   Apprehension/Relocation Not assessed   Hornblower's -   Bicep Load II Not assessed      Reflexes/Sensation:               [x]? Dermatomes/Myotomes intact               []? Reflexes equal and normal bilaterally               []? Other:     Joint mobility:               []? Normal               []? Hypo              []? Hyper     Palpation: no TTP anterior shoulder, RC insertion, posterior cuff, around scapula, UT/neck     Functional Mobility/Transfers: Independent     Posture: rounded shoulders    RESTRICTIONS/PRECAUTIONS:     Exercises/Interventions:     Exercise/Equipment Resistance/Repetitions Other comments   Stretching/PROM     Wand     Table Slides     UE Mystic     Pulleys     Pendulum          Isometrics     Retraction          Weight shift     Flexion     Abduction     External Rotation     Internal Rotation     Biceps     Triceps          PRE's     Flexion     Abduction     External Rotation 3x10 1#  sidelying   Internal Rotation     Shrugs     EXT     Reverse Flys     Serratus Punches 3x10 5# B    Horizontal Abd with ER     Biceps     Triceps     Retraction     Prone Ext x20 Tactile cues for mid/low trap activation   Prone T x20 Tactile cues for mid/low trap activation   Prone Row x20 Tactile cues for mid/low trap activation        Cable Column/Theraband     External Rotation 3x10 red    Internal Rotation 3x10 green    Shrugs     Lats     Ext     Flex     Scapular Retraction     BIC     TRIC     PNF Dynamic Stability          Plyoback          Manual interventions                 Plan for next session: progress as tolerated    Therapeutic Exercise and NMR EXR  [x] (80040) Provided verbal/tactile cueing for activities related to strengthening, flexibility, endurance, ROM  for improvements in scapular, scapulothoracic and UE control with self care, reaching, carrying, lifting, house/yardwork, driving/computer work.    [] (11716) Provided verbal/tactile cueing for activities related to improving balance, coordination, kinesthetic sense, posture, motor skill, proprioception  to assist with  scapular, scapulothoracic and UE control with self care, reaching, carrying, lifting, house/yardwork, driving/computer work. Therapeutic Activities:    [] (53436 or 62438) Provided verbal/tactile cueing for activities related to improving balance, coordination, kinesthetic sense, posture, motor skill, proprioception and motor activation to allow for proper function of scapular, scapulothoracic and UE control with self care, carrying, lifting, driving/computer work. Home Exercise Program:    Access Code: [email or web address]  URL: [email or web address]  Date: 05/04/2021  Prepared by: Mary Carcamo    Updated 5/12/21.  Printed handout provided.     Exercises  · Sidelying Shoulder ER with Towel and Dumbbell - 1 x daily - 7 x weekly - 3 sets - 10 reps  · Supine Scapular Protraction in Flexion with Dumbbells - 1 x daily - 7 x weekly - 3 sets - 10 reps  · Prone Shoulder Extension - 1 x daily - 7 x weekly - 1 sets - 10 reps - 10 second hold  · Prone Shoulder Horizontal Abduction - 1 x daily - 7 x weekly - 1-2 sets - 10 reps  · Prone Shoulder Row - 1 x daily - 7 x weekly - 1-2 sets - 10 reps  · Prone Shoulder Extension - Single Arm - 1 x daily - 7 x weekly - 1-2 sets - 10 reps  · Shoulder External Rotation with Anchored Resistance - 1 x daily - 7 x weekly - 10 reps - 3 sets  · Shoulder Internal Rotation with Resistance - 1 x daily - 7 x weekly - 10 reps - 3 sets      [x] (38165) Reviewed/Progressed HEP activities related to strengthening, flexibility, endurance, ROM of scapular, scapulothoracic and UE control with self care, reaching, carrying, lifting, house/yardwork, driving/computer work  [] (06636) Reviewed/Progressed HEP activities related to improving balance, coordination, kinesthetic sense, posture, motor skill, proprioception of scapular, scapulothoracic and UE control with self care, reaching, carrying, lifting, house/yardwork, driving/computer work      Manual Treatments:  [] (76200) Provided manual therapy to mobilize soft tissue/joints of cervical/CT, scapular GHJ and UE for the purpose of modulating pain, promoting relaxation,  increasing ROM, reducing/eliminating soft tissue swelling/inflammation/restriction, improving soft tissue extensibility and allowing for proper ROM for normal function with self care, reaching, carrying, lifting, house/yardwork, driving/computer work    Modalities:  none    Charges:  Timed Code Treatment Minutes: 40   Total Treatment Minutes: 40   Time in: 3:53  Time out: 4:35    [] EVAL (LOW) 18134 (typically 20 minutes face-to-face)  [] EVAL (MOD) 53669 (typically 30 minutes face-to-face)  [] EVAL (HIGH) 423 8935 (typically 45 minutes face-to-face)  [] RE-EVAL     [x] TD(28256) x 2    [] IONTO  [x] NMR (02951) x  1   [] VASO  [] Manual ([id number]) x      [] Other:  [] TA x      [] Mech Traction (82290)  [] ES(attended) (41029)      [] ES (un) (32433):     GOALS:  Patient stated goal: improve strength and have less pain    []? Progressing: []? Met: []? Not Met: []? Adjusted     Therapist goals for Patient:   Short Term Goals: To be achieved in: 2 weeks 5/18/21  1. Independent in HEP and progression per patient tolerance, in order to prevent re-injury. []? Progressing: []? Met: []? Not Met: []? Adjusted   2.  Patient will have a decrease in pain to facilitate improvement in movement, function, and ADLs as indicated by Functional Deficits as well as improved tolerance to sleeping.    []? Progressing: []? Met: []? Not Met: []? Adjusted     Long Term Goals: To be achieved in: 8 weeks 6/29/21  1. Disability index score of 5% or less for the UEFS to assist with reaching prior level of function. []? Progressing: []? Met: []? Not Met: []? Adjusted  2. Patient will demonstrate increased AROM shoulder flexion to 170 or greater to allow for proper joint functioning as indicated by patients Functional Deficits. []? Progressing: []? Met: []? Not Met: []? Adjusted  3. Patient will demonstrate an increase in LUE RC and gallito-scap strength to 4/5 or greater to allow for proper functional mobility as indicated by patients Functional Deficits. []? Progressing: []? Met: []? Not Met: []? Adjusted  4. Patient will return to sleeping, reaching across body and reaching behind back without increased symptoms or restriction. []? Progressing: []? Met: []? Not Met: []? Adjusted  5. Patient will be able to golf with 0/10 pain for 18 holes. []? Progressing: []? Met: []? Not Met: []? Adjusted    Overall Progression Towards Functional goals/ Treatment Progress Update:  [] Patient is progressing as expected towards functional goals listed. [] Progression is slowed due to complexities/Impairments listed. [] Progression has been slowed due to co-morbidities.   [x] Plan just implemented, too soon to assess goals progression <30days   [] Goals require adjustment due to lack of progress  [] Patient is not progressing as expected and requires additional follow up with physician  [] Other    Prognosis for POC: [x] Good [] Fair  [] Poor      Patient requires continued skilled intervention: [x] Yes  [] No    Treatment/Activity Tolerance:  [x] Patient able to complete treatment  [] Patient limited by fatigue  [] Patient limited by pain     [] Patient limited by other medical complications  [] Other: Pt experienced anterior shoulder pain with sidelying ER, feels that it is lingering pain from MD visit. Exercise was stopped and performed prone trap exercises instead. Pt was challenged by prone exercises, exhibits UT compensation and limited mid and low trap engagement. Pt required tactile cues for proper mid and low trap recruitment. Form and muscle activation improved as reps progressed, but reported that exercises were very tiring. Following prone series returned to sidelying ER and pt was able to complete without shoulder soreness. Pt was able to complete TB ER without any joint/shoulder clicking, discussed with pt that this could be attributed to trap work that was completed previously and improved NMR control. Pt requires PT follow up to address pain, strength and functional mobility deficits. PLAN: See eval  [x] Continue per plan of care [] Alter current plan (see comments above)  [] Plan of care initiated [] Hold pending MD visit [] Discharge      Electronically signed by:  Marleni Sandoval, PT, DPT, OCS  Physical Therapist  [id number]  [email or web address]      Note: If patient does not return for scheduled/ recommended follow up visits, this note will serve as a discharge from care along with most recent update on progress.

## 2021-05-12 NOTE — PROGRESS NOTES
Patient returns today for Left shoulder. Felt had mainly AC arthrosis and rotator cuff tendinitis on the left. He received an injection into both knees doing some therapy he feels he may be 20 or 25% improved. Is not waking him at night and this does not bother him for most activities of daily living. He is not on an anti-inflammatory medication. ROS: Pertinent items are noted in HPI. No notes on file    History reviewed. No pertinent past medical history. Past Surgical History:  2/28/2019: ELBOW FRACTURE SURGERY; Left      Comment:  LEFT ELBOW LATERAL EPICONDYLITIS DEBRIDEMENT AND TENDON                REPAIR performed by Reilly Busby MD at                [place] [phone]  11/8/2019: ELBOW FRACTURE SURGERY; Right      Comment:  RIGHT ELBOW LATERAL EPICONDYLITIS DEBRIDEMENT AND TENDON               REPAIR performed by Reilly Busby MD at                [place] [phone]    History reviewed. No pertinent family history.       Social History    Socioeconomic History      Marital status:       Spouse name: None      Number of children: None      Years of education: None      Highest education level: None    Occupational History      None    Social Needs      Financial resource strain: None      Food insecurity        Worry: None        Inability: None      Transportation needs        Medical: None        Non-medical: None    Tobacco Use      Smoking status: Never Smoker      Smokeless tobacco: Never Used    Substance and Sexual Activity      Alcohol use: Yes        Comment: occasionally      Drug use: None      Sexual activity: None    Lifestyle      Physical activity        Days per week: None        Minutes per session: None      Stress: None    Relationships      Social connections        Talks on phone: None        Gets together: None        Attends Alevism service: None        Active member of club or organization: None        Attends meetings of clubs or organizations: None        Relationship status: None      Intimate partner violence        Fear of current or ex partner: None        Emotionally abused: None        Physically abused: None        Forced sexual activity: None    Other Topics      Concerns:        None    Social History Narrative      None      Current Outpatient Medications:  meloxicam (MOBIC) 15 MG tablet, Take 1 tablet by mouth daily (Patient not taking: Reported on 4/12/2021), Disp: 30 tablet, Rfl: 1  predniSONE (DELTASONE) 10 MG tablet, SIG: iii po BID x 2 days then ii po BID x 2 days then i po BID x 2 days then i po qd x 2 days (Patient not taking: Reported on 4/12/2021), Disp: 26 tablet, Rfl: 0    No current facility-administered medications for this visit. No Known Allergies    VITAL SIGNS:  Temp 98.6 °F (37 °C)   Ht 5' 10\" (1.778 m)   Wt 175 lb (79.4 kg)   BMI 25.11 kg/m²   On examination today he can get 180 degrees active flexion abduction without pain although it may feel a little bit tight in the extreme abduction. He has no degrees external rotation is negative apprehension and relocation test.  He has negative jerk test.    He is tender to [facility] joint has superior pain with cross crossarm adduction test.  He does have a click in [facility] joint with circumduction. He has symmetric cuff tenderness and moderately positive impingement signs. He has a negative sulcus sign. He has no supraspinatus or infraspinatus atrophy. He appears to have good strong and symmetrical external rotation strength. Negative belly press test.    Impression: Left shoulder AC arthrosis and rotator cuff tendinitis which is slowly improving. Plan: Since he is not on anti-inflammatory we have given you prescription for diclofenac with instructions how to take it usual GI warnings. Told him he could cut back to 1 of these feels better on Tuesday. He will continue physical therapy. We will see him back in a month.     Examination, explanations of his condition and instructions for medication lasted about 20 minutes.

## 2021-05-20 ENCOUNTER — HOSPITAL ENCOUNTER (OUTPATIENT)
Dept: PHYSICAL THERAPY | Age: 46
Setting detail: THERAPIES SERIES
Discharge: HOME | End: 2021-05-20
Payer: COMMERCIAL

## 2021-05-20 PROCEDURE — 97110 THERAPEUTIC EXERCISES: CPT | Performed by: PHYSICAL THERAPIST

## 2021-05-20 NOTE — FLOWSHEET NOTE
[facility] [address] [place] [address] [facility]    [facility] Therapy Daily Treatment Note  Date:  2021    Patient Name:  Paulo Mccormick    :  1975  MRN: [id number]  Restrictions/Precautions:    Medical/Treatment Diagnosis Information:  · Diagnosis: M75.82 (ICD-10-CM) - Tendinitis of left rotator cuff  · Treatment Diagnosis: R53.1, T68.159  Insurance/Certification information:  PT Insurance Information: PT BENEFITS  FACILITY/ ANTHEM/ [id number]/ ACTIVE/ DED 4000 MET / PAYS 100%/ OOP 4000 MET  VPCY HARD MAX/ 0 AUTH/  ALL CODES BILLABLE/ TELEHEALTH SAME BENEFIT/ [facility] REF# [id number]/ 5-3-2021 PAG  Physician Information:  Referring Practitioner: Louie Sanchez. Azam Rendon MD  Has the plan of care been signed (Y/N):        []  Yes  [x]  No     Date of Patient follow up with Physician: 21      Is this a Progress Report:     []  Yes  [x]  No        If Yes:  Date Range for reporting period:  Beginning  Ending    Progress report will be due (10 Rx or 30 days whichever is less):        Recertification will be due (POC Duration  / 90 days whichever is less): 21         Visit # Insurance Allowable Auth Required   3 60 []  Yes []  No        Functional Scale:   Date assessed:   UEFI 72/80=90% (10% deficit) 21    Latex Allergy:  [x]NO      []YES  Preferred Language for Healthcare:   [x]English       []other:    Pain level:  5-6/10     SUBJECTIVE:  Pt continues to have clicking with ER TB exercise, but it is not painful. Pt mowed lawn yesterday, not as painful as in the past. Zahida Carrs Friday and Saturday, could not get his shoulder to calm down, shoulder was uncomfortable for the whole round. Pt can not identify anything he could have done to flare up shoulder. Pt has not played golf since Saturday. States that HEP is going well. Pt states that pain was isolated to the anterior shoulder, about 1-2 inch span.  Pt is taking diclofenac, started Tactile cues for mid/low trap activation   Prone T 2x10 Tactile cues for mid/low trap activation   Prone Row 3x10 2# Tactile cues for mid/low trap activation        Cable Column/Theraband     External Rotation    Internal Rotation    Shrugs     Lats     Ext     Flex     Scapular Retraction     BIC     TRIC     PNF     Horizontal ABD 2x10 green, supine    W ER 3x8 red         Dynamic Stability          Plyoback          Manual interventions                 Plan for next session: progress as tolerated    Therapeutic Exercise and NMR EXR  [x] (45103) Provided verbal/tactile cueing for activities related to strengthening, flexibility, endurance, ROM  for improvements in scapular, scapulothoracic and UE control with self care, reaching, carrying, lifting, house/yardwork, driving/computer work.    [] (07305) Provided verbal/tactile cueing for activities related to improving balance, coordination, kinesthetic sense, posture, motor skill, proprioception  to assist with  scapular, scapulothoracic and UE control with self care, reaching, carrying, lifting, house/yardwork, driving/computer work. Therapeutic Activities:    [] (52007 or 98914) Provided verbal/tactile cueing for activities related to improving balance, coordination, kinesthetic sense, posture, motor skill, proprioception and motor activation to allow for proper function of scapular, scapulothoracic and UE control with self care, carrying, lifting, driving/computer work. Home Exercise Program:    Access Code: [email or web address]  URL: [email or web address]  Date: 05/04/2021  Prepared by: Sisi Mejia    Updated 5/12/21.  Printed handout provided.     Exercises  · Sidelying Shoulder ER with Towel and Dumbbell - 1 x daily - 7 x weekly - 3 sets - 10 reps  · Supine Scapular Protraction in Flexion with Dumbbells - 1 x daily - 7 x weekly - 3 sets - 10 reps  · Prone Shoulder Extension - 1 x daily - 7 x weekly - 1 sets - 10 reps - 10 second hold  · Prone Shoulder Horizontal Abduction - 1 x daily - 7 x weekly - 1-2 sets - 10 reps  · Prone Shoulder Row - 1 x daily - 7 x weekly - 1-2 sets - 10 reps  · Prone Shoulder Extension - Single Arm - 1 x daily - 7 x weekly - 1-2 sets - 10 reps  · Shoulder External Rotation with Anchored Resistance - 1 x daily - 7 x weekly - 10 reps - 3 sets  · Shoulder Internal Rotation with Resistance - 1 x daily - 7 x weekly - 10 reps - 3 sets      [x] (21739) Reviewed/Progressed HEP activities related to strengthening, flexibility, endurance, ROM of scapular, scapulothoracic and UE control with self care, reaching, carrying, lifting, house/yardwork, driving/computer work  [] (54070) Reviewed/Progressed HEP activities related to improving balance, coordination, kinesthetic sense, posture, motor skill, proprioception of scapular, scapulothoracic and UE control with self care, reaching, carrying, lifting, house/yardwork, driving/computer work      Manual Treatments:  [] ([address] [facility]) Provided manual therapy to mobilize soft tissue/joints of cervical/CT, scapular GHJ and UE for the purpose of modulating pain, promoting relaxation,  increasing ROM, reducing/eliminating soft tissue swelling/inflammation/restriction, improving soft tissue extensibility and allowing for proper ROM for normal function with self care, reaching, carrying, lifting, house/yardwork, driving/computer work    Modalities:  none    Charges:  Timed Code Treatment Minutes: 60   Total Treatment Minutes: 60   Time in: 3:21  Time out: 4:49    [] EVAL (LOW) 78198 (typically 20 minutes face-to-face)  [] EVAL (MOD) 01367 (typically 30 minutes face-to-face)  [] EVAL (HIGH) 00522 (typically 45 minutes face-to-face)  [] RE-EVAL     [x] LD(03706) x 4    [] IONTO  [] NMR (68107) x     [] VASO  [] Manual (86163) x      [] Other:  [] TA x      [] Mech Traction (86349)  [] ES(attended) (17858)      [] ES (un) (22007):     GOALS:  Patient stated goal: improve strength and have less pain    []?  Progressing: []? Met: []? Not Met: []? Adjusted     Therapist goals for Patient:   Short Term Goals: To be achieved in: 2 weeks 5/18/21  1. Independent in HEP and progression per patient tolerance, in order to prevent re-injury. []? Progressing: []? Met: []? Not Met: []? Adjusted   2. Patient will have a decrease in pain to facilitate improvement in movement, function, and ADLs as indicated by Functional Deficits as well as improved tolerance to sleeping.    []? Progressing: []? Met: []? Not Met: []? Adjusted     Long Term Goals: To be achieved in: 8 weeks 6/29/21  1. Disability index score of 5% or less for the UEFS to assist with reaching prior level of function. []? Progressing: []? Met: []? Not Met: []? Adjusted  2. Patient will demonstrate increased AROM shoulder flexion to 170 or greater to allow for proper joint functioning as indicated by patients Functional Deficits. []? Progressing: []? Met: []? Not Met: []? Adjusted  3. Patient will demonstrate an increase in LUE RC and gallito-scap strength to 4/5 or greater to allow for proper functional mobility as indicated by patients Functional Deficits. []? Progressing: []? Met: []? Not Met: []? Adjusted  4. Patient will return to sleeping, reaching across body and reaching behind back without increased symptoms or restriction. []? Progressing: []? Met: []? Not Met: []? Adjusted  5. Patient will be able to golf with 0/10 pain for 18 holes. []? Progressing: []? Met: []? Not Met: []? Adjusted    Overall Progression Towards Functional goals/ Treatment Progress Update:  [] Patient is progressing as expected towards functional goals listed. [] Progression is slowed due to complexities/Impairments listed. [] Progression has been slowed due to co-morbidities.   [x] Plan just implemented, too soon to assess goals progression <30days   [] Goals require adjustment due to lack of progress  [] Patient is not progressing as expected and requires additional follow up with physician  [] Other    Prognosis for POC: [x] Good [] Fair  [] Poor      Patient requires continued skilled intervention: [x] Yes  [] No    Treatment/Activity Tolerance:  [x] Patient able to complete treatment  [] Patient limited by fatigue  [] Patient limited by pain     [] Patient limited by other medical complications  [] Other: Pt noted some anterior shoulder discomfort by 2nd set of supine HABD, pt's elbows were in bent position, with VCs to keep elbows straight pt reported a reduction in discomfort. Discussed that elbow bent position engaged bicep and can increase stress/straight through LHBT. Pt able to add weight to prone row, demo'd good trap activation. Pt reported increased fatigue with increase to 15 reps on SL ER but didn't feel too difficulty, discussed trying 2# at home. Pt tolerated W ER well and without clicking that he feels on single arm TB ER. Pt experienced popping in shoulder during prone T, did not experience this LPV or over the last week during HEP, states that it feels completely different than the click he feels with TB ER exercise. Pt did not exhibit this popping in any other exercise position this date. All special tests indicative of RC tear or labral tear are negative. Very mild discomfort with IR resistance during IRRST. Pt does have pain with belly press but no weakness is noted. pt exhibits full ER but limited IR, does report pain at end range IR. Performed sleeper stretch which was painful but tolerable until 9th/10th rep when the pain was increased. Attempted BB IR stretch, too painful. Pt had pain with door way stretch. Pt had pain with pec stretch off EOB when arm at 90 deg ABD, able to tolerate at lower position, pt educated using pool noodle at home to allow arm to stay in supported position. Pt requires PT follow up to address pain, strength and functional mobility deficits.                    PLAN: See eval  [x] Continue per plan of care [] Alter current plan (see comments above)  [] Plan of care initiated [] Hold pending MD visit [] Discharge      Electronically signed by:  Doris Veliz PT, DPT, OCS  Physical Therapist  [id number]  [email or web address]      Note: If patient does not return for scheduled/ recommended follow up visits, this note will serve as a discharge from care along with most recent update on progress.

## 2021-05-25 ENCOUNTER — HOSPITAL ENCOUNTER (OUTPATIENT)
Dept: PHYSICAL THERAPY | Age: 46
Setting detail: THERAPIES SERIES
Discharge: HOME | End: 2021-05-25
Payer: COMMERCIAL

## 2021-05-25 PROCEDURE — 97110 THERAPEUTIC EXERCISES: CPT | Performed by: PHYSICAL THERAPIST

## 2021-05-25 NOTE — FLOWSHEET NOTE
The [address] [place] [address] [facility]    Physical Therapy Daily Treatment Note  Date:  2021    Patient Name:  Valentine Marshall    :  1975  MRN: [id number]  Restrictions/Precautions:    Medical/Treatment Diagnosis Information:  · Diagnosis: M75.82 (ICD-10-CM) - Tendinitis of left rotator cuff  · Treatment Diagnosis: R53.1, S20.710  Insurance/Certification information:  PT Insurance Information: PT BENEFITS  FACILITY/ ANTHEM/ [id number]/ ACTIVE/ DED 4000 MET / PAYS 100%/ OOP 4000 MET  VPCY HARD MAX/ 0 AUTH/  ALL CODES BILLABLE/ TELEHEALTH SAME BENEFIT/ [facility] REF# [id number]/ 5-3-2021 PAG  Physician Information:  Referring Practitioner: Brady Velasquez. Kassandra Butt MD  Has the plan of care been signed (Y/N):        []  Yes  [x]  No     Date of Patient follow up with Physician: 21      Is this a Progress Report:     []  Yes  [x]  No        If Yes:  Date Range for reporting period:  Beginning  Ending    Progress report will be due (10 Rx or 30 days whichever is less): 18       Recertification will be due (POC Duration  / 90 days whichever is less): 21         Visit # Insurance Allowable Auth Required   4 60 []  Yes []  No        Functional Scale:   Date assessed:   UEFI 72/80=90% (10% deficit) 21    Latex Allergy:  [x]NO      []YES  Preferred Language for Healthcare:   [x]English       []other:    Pain level:  5-6/10     SUBJECTIVE:  Pt states that he is feeling better than last week. Woke up Friday after being here and had a stiff neck, had dry needling done and felt better after that. Pt has played a fair amount of golf since LPV and did not have too much pain, felt better each time he golfed. Did not experience any of the same popping between visits as he did during LPV.     OBJECTIVE:              ROM PROM AROM  Comment     L R L R     Flexion     164 164     Abduction     175 180     ER     100 110     IR     33 50   Other  (cervical)             Other                 Strength L R Comment   Flexion 3+/5 p! 4/5     Abduction 3+/5 p! 4/5     ER 3+/5 p! 4/5     IR 3+/5 p! 4/5     Lower Trap 3+/5  4-/5     Mid Trap 3+/5  4/5     Biceps 4/5 p! 4+/5     Triceps 4/5 4+/5        Special Tests Results/Comment   IRRST + IR > ER   Gastelum-Erwin +   Neers +   Painful Arc -   Drop Arm -   ER Lag Sign -   Empty Can / Geovanni's -   Champagne Toast -   Belly press + for p! Speeds - for p!, + for weakness   [place] - by definition of test, > p! With thumb up   Cross body adduction +   Apprehension/Relocation -   Hornblower's -   Bicep Load II -      Reflexes/Sensation:               [x]? Dermatomes/Myotomes intact               []? Reflexes equal and normal bilaterally               []? Other:     Joint mobility:               []? Normal               []? Hypo              []? Hyper     Palpation: no TTP anterior shoulder, RC insertion, posterior cuff, around scapula, UT/neck     Functional Mobility/Transfers: Independent     Posture: rounded shoulders    RESTRICTIONS/PRECAUTIONS:     Exercises/Interventions:     Exercise/Equipment Resistance/Repetitions Other comments   Stretching/PROM     Wand     Table Slides     UE [place]     Pulleys     Pendulum     BB IR Attempted-p! Sleeper stretch    Doorway ER    Pec stretch off table    CBA         Isometrics     Retraction          Weight shift     Flexion     Abduction     External Rotation     Internal Rotation     Biceps     Triceps          PRE's     Flexion Attempted-p!     Abduction 3x10 2#    External Rotation 3x15 2#  sidelying   Internal Rotation     Shrugs     EXT     Reverse Flys     Serratus Punches 3x10 5# B    Biceps     Triceps 3x10 5#    Retraction     Prone Y 2x10x3\" hand behind head    Prone Ext 3x10 1# Tactile cues for mid/low trap activation   Prone T  Tactile cues for mid/low trap activation   Prone Row 3x10 2# Tactile cues for mid/low trap activation        [employer] Column/Theraband     External Rotation    Internal Rotation    Shrugs     Lats     Ext     Flex     Scapular Retraction     BIC     TRIC     PNF     Horizontal ABD 3x10 green, supine    W ER 3x8 red         Dynamic Stability          Plyoback          Manual interventions                 Plan for next session: progress as tolerated    Therapeutic Exercise and NMR EXR  [x] (46276) Provided verbal/tactile cueing for activities related to strengthening, flexibility, endurance, ROM  for improvements in scapular, scapulothoracic and UE control with self care, reaching, carrying, lifting, house/yardwork, driving/computer work.    [] (90139) Provided verbal/tactile cueing for activities related to improving balance, coordination, kinesthetic sense, posture, motor skill, proprioception  to assist with  scapular, scapulothoracic and UE control with self care, reaching, carrying, lifting, house/yardwork, driving/computer work. Therapeutic Activities:    [] (70660 or 47887) Provided verbal/tactile cueing for activities related to improving balance, coordination, kinesthetic sense, posture, motor skill, proprioception and motor activation to allow for proper function of scapular, scapulothoracic and UE control with self care, carrying, lifting, driving/computer work. Home Exercise Program:    Access Code: [email or web address]  URL: [email or web address]  Date: 05/04/2021  Prepared by: Alida Beltran    Updated 5/12/21. Printed handout provided. Updated 5/25/21.  Pt to access online.     Exercises  · Supine Chest Stretch on Foam Roll - 1 x daily - 7 x weekly - 1 sets - 3 reps - 30 second hold  · Standing Cross Body Shoulder Stretch at Wall - 1 x daily - 7 x weekly - 3 reps - 1 sets - 30 second hold  · Sidelying Shoulder ER with Towel and Dumbbell - 1 x daily - 7 x weekly - 3 sets - 15 reps  · Supine Scapular Protraction in Flexion with Dumbbells - 1 x daily - 7 x weekly - 3 sets - 10 reps  · Prone Scapular Retraction with Hands Behind Head - 1 x daily - 7 x weekly - 2 sets - 10 reps - 3 second hold  · Prone Shoulder Row - 1 x daily - 7 x weekly - 1-2 sets - 10 reps  · Prone Shoulder Extension - Single Arm - 1 x daily - 7 x weekly - 1-2 sets - 10 reps  · Shoulder Internal Rotation with Resistance - 1 x daily - 7 x weekly - 10 reps - 3 sets  · Supine Shoulder Horizontal Abduction with Resistance - 1 x daily - 7 x weekly - 3 sets - 10 reps  · Shoulder External Rotation and Scapular Retraction with Resistance - 1 x daily - 7 x weekly - 3 sets - 10 reps  · Supine Elbow Flexion Extension with Dumbbell - 1 x daily - 7 x weekly - 3 sets - 10 reps      [x] (36537) Reviewed/Progressed HEP activities related to strengthening, flexibility, endurance, ROM of scapular, scapulothoracic and UE control with self care, reaching, carrying, lifting, house/yardwork, driving/computer work  [] (69857) Reviewed/Progressed HEP activities related to improving balance, coordination, kinesthetic sense, posture, motor skill, proprioception of scapular, scapulothoracic and UE control with self care, reaching, carrying, lifting, house/yardwork, driving/computer work      Manual Treatments:  [] ([id number]) Provided manual therapy to mobilize soft tissue/joints of cervical/CT, scapular GHJ and UE for the purpose of modulating pain, promoting relaxation,  increasing ROM, reducing/eliminating soft tissue swelling/inflammation/restriction, improving soft tissue extensibility and allowing for proper ROM for normal function with self care, reaching, carrying, lifting, house/yardwork, driving/computer work    Modalities:  none    Charges:  Timed Code Treatment Minutes: 43   Total Treatment Minutes: 43   Time in: 4:15  Time out: 4:58    [] EVAL (LOW) 43151 (typically 20 minutes face-to-face)  [] EVAL (MOD) 08130 (typically 30 minutes face-to-face)  [] EVAL (HIGH) 46233 (typically 45 minutes face-to-face)  [] RE-EVAL     [x] PI(05544) x 3    [] IONTO  [] NMR (26706) x     [] VASO  [] Manual (64254) x      [] Other:  [] TA x      [] Mech Traction (13700)  [] ES(attended) (36992)      [] ES (un) (37373):     GOALS:  Patient stated goal: improve strength and have less pain    []? Progressing: []? Met: []? Not Met: []? Adjusted     Therapist goals for Patient:   Short Term Goals: To be achieved in: 2 weeks 5/18/21  1. Independent in HEP and progression per patient tolerance, in order to prevent re-injury. []? Progressing: []? Met: []? Not Met: []? Adjusted   2. Patient will have a decrease in pain to facilitate improvement in movement, function, and ADLs as indicated by Functional Deficits as well as improved tolerance to sleeping.    []? Progressing: []? Met: []? Not Met: []? Adjusted     Long Term Goals: To be achieved in: 8 weeks 6/29/21  1. Disability index score of 5% or less for the UEFS to assist with reaching prior level of function. []? Progressing: []? Met: []? Not Met: []? Adjusted  2. Patient will demonstrate increased AROM shoulder flexion to 170 or greater to allow for proper joint functioning as indicated by patients Functional Deficits. []? Progressing: []? Met: []? Not Met: []? Adjusted  3. Patient will demonstrate an increase in LUE RC and gallito-scap strength to 4/5 or greater to allow for proper functional mobility as indicated by patients Functional Deficits. []? Progressing: []? Met: []? Not Met: []? Adjusted  4. Patient will return to sleeping, reaching across body and reaching behind back without increased symptoms or restriction. []? Progressing: []? Met: []? Not Met: []? Adjusted  5. Patient will be able to golf with 0/10 pain for 18 holes. []? Progressing: []? Met: []? Not Met: []? Adjusted    Overall Progression Towards Functional goals/ Treatment Progress Update:  [] Patient is progressing as expected towards functional goals listed. [] Progression is slowed due to complexities/Impairments listed. [] Progression has been slowed due to co-morbidities.   [x] Plan just implemented, too soon to assess goals progression <30days   [] Goals require adjustment due to lack of progress  [] Patient is not progressing as expected and requires additional follow up with physician  [] Other    Prognosis for POC: [x] Good [] Fair  [] Poor      Patient requires continued skilled intervention: [x] Yes  [] No    Treatment/Activity Tolerance:  [x] Patient able to complete treatment  [] Patient limited by fatigue  [] Patient limited by pain     [] Patient limited by other medical complications  [] Other: Pt demonstrates improved scapular control and trap activation during prone row compared to prior sessions, did require VCs to perform exercise more slowly. Also demo's improved form with prone ext, able to add weight and tolerated without pain for first 2 sets but final 4 reps of 3rd set exacerbated anterior shoulder pain. Pt tolerated modified prone Y well, reported muscle fatigued, no shoulder pain during exercise but did have some L shoulder discomfort/soreness getting arm out of exercise position. Pt noted tolerable discomfort in shoulder during SL ABD when he arm moved out of neutral positioning. Pt noted anterior shoulder pain that was the same as what brought him to MD initially with sidelying flex, hold for now. Pt noted increased fatigue with W ER this date, likely d/t increased intensity of program and performing this exercise last. Pt reported that his shoulder felt tired by end of session but no pain- all pain felt during session resolved once irritating movement was stopped. Pt requires PT follow up to address pain, strength and functional mobility deficits. PLAN: See eval  [x] Continue per plan of care [] Alter current plan (see comments above)  [] Plan of care initiated [] Hold pending MD visit [] Discharge      Electronically signed by:  Marleni Sandoval, PT, DPT, OCS  Physical Therapist  [id number]  [email or web address]      Note: If patient does not return for scheduled/ recommended follow up visits, this note will serve as a discharge from care along with most recent update on progress.

## 2021-06-09 ENCOUNTER — OFFICE VISIT (OUTPATIENT)
Dept: ORTHOPEDIC SURGERY | Age: 46
End: 2021-06-09
Payer: COMMERCIAL

## 2021-06-09 VITALS — TEMPERATURE: 98.6 F | BODY MASS INDEX: 25.05 KG/M2 | WEIGHT: 175 LBS | HEIGHT: 70 IN

## 2021-06-09 DIAGNOSIS — M19.012 ARTHRITIS OF LEFT ACROMIOCLAVICULAR JOINT: ICD-10-CM

## 2021-06-09 DIAGNOSIS — M75.82 TENDINITIS OF LEFT ROTATOR CUFF: Primary | ICD-10-CM

## 2021-06-09 PROCEDURE — 99212 OFFICE O/P EST SF 10 MIN: CPT | Performed by: ORTHOPAEDIC SURGERY

## 2021-06-09 NOTE — PROGRESS NOTES
Here today for his left shoulder. He had some AC arthrosis and rotator cuff tendinitis. We injected him 2 months ago and last month he was started on diclofenac. He has been going to therapy. He said he played 9 holes of golf today and 18 over the weekend and is really having no symptoms. ROS: Pertinent items are noted in HPI. No notes on file    History reviewed. No pertinent past medical history. Past Surgical History:  2/28/2019: ELBOW FRACTURE SURGERY; Left      Comment:  LEFT ELBOW LATERAL EPICONDYLITIS DEBRIDEMENT AND TENDON                REPAIR performed by Angela Ford MD at                [place] [address]  11/8/2019: ELBOW FRACTURE SURGERY; Right      Comment:  RIGHT ELBOW LATERAL EPICONDYLITIS DEBRIDEMENT AND TENDON               REPAIR performed by Angela Ford MD at                [place] [address]    History reviewed. No pertinent family history.       Social History    Socioeconomic History      Marital status:       Spouse name: None      Number of children: None      Years of education: None      Highest education level: None    Occupational History      None    Tobacco Use      Smoking status: Never Smoker      Smokeless tobacco: Never Used    Substance and Sexual Activity      Alcohol use: Yes        Comment: occasionally      Drug use: None      Sexual activity: None    Other Topics      Concerns:        None    Social History Narrative      None    Social Determinants of Health  Financial Resource Strain:       Difficulty of Paying Living Expenses:   Food Insecurity:       Worried About Running Out of Food in the Last Year:       Ran Out of Food in the Last Year:   Transportation Needs:       Lack of Transportation (Medical):       Lack of Transportation (Non-Medical):   Physical Activity:       Days of Exercise per Week:       Minutes of Exercise per Session:   Stress:       Feeling of Stress :   Social Connections:       Frequency of Communication with Friends and Family:       Frequency of Social Gatherings with Friends and Family:       Attends [employer] Services:       Active Member of Clubs or Organizations:       Attends Club or Organization Meetings:       Marital Status:   Intimate Partner Violence:       Fear of Current or Ex-Partner:       Emotionally Abused:       Physically Abused:       Sexually Abused:     Current Outpatient Medications:  diclofenac (VOLTAREN) 75 MG EC tablet, Take 1 tablet by mouth 2 times daily 1 tablet by mouth twice a day, Disp: 60 tablet, Rfl: 2  meloxicam (MOBIC) 15 MG tablet, Take 1 tablet by mouth daily (Patient not taking: Reported on 4/12/2021), Disp: 30 tablet, Rfl: 1  predniSONE (DELTASONE) 10 MG tablet, SIG: iii po BID x 2 days then ii po BID x 2 days then i po BID x 2 days then i po qd x 2 days (Patient not taking: Reported on 4/12/2021), Disp: 26 tablet, Rfl: 0    No current facility-administered medications for this visit. No Known Allergies    VITAL SIGNS:  Temp 98.6 °F (37 °C)   Ht 5' 10\" (1.778 m)   Wt 175 lb (79.4 kg)   BMI 25.11 kg/m²   On examination today gets underneath degrees active flexion and abduction with no pain. With circumduction of the shoulder he gets little click in [facility], [place] joint but is not painful today. He has a negative crossarm test today. He has a little bit anterior cuff tenderness with slightly greater than his opposite shoulder but negative impingement signs. He has excellent internal and external rotation strength. He is negative belly press test.  There is no supraspinatus or infraspinatus atrophy. Impression left shoulder AC arthrosis and rotator cuff tendinitis largely resolved. Plan: I told him he can probably back off to 1 Voltaren a day for a week and if he still feeling well he may just take it before he plays golf. He will return if needed.

## 2021-12-21 ENCOUNTER — APPOINTMENT (RX ONLY)
Dept: URBAN - METROPOLITAN AREA CLINIC 170 | Facility: CLINIC | Age: 46
Setting detail: DERMATOLOGY
End: 2021-12-21

## 2021-12-21 DIAGNOSIS — L82.1 OTHER SEBORRHEIC KERATOSIS: ICD-10-CM | Status: STABLE

## 2021-12-21 DIAGNOSIS — D22 MELANOCYTIC NEVI: ICD-10-CM | Status: STABLE

## 2021-12-21 DIAGNOSIS — D18.0 HEMANGIOMA: ICD-10-CM | Status: STABLE

## 2021-12-21 DIAGNOSIS — L81.4 OTHER MELANIN HYPERPIGMENTATION: ICD-10-CM | Status: STABLE

## 2021-12-21 PROBLEM — D18.01 HEMANGIOMA OF SKIN AND SUBCUTANEOUS TISSUE: Status: ACTIVE | Noted: 2021-12-21

## 2021-12-21 PROBLEM — D22.5 MELANOCYTIC NEVI OF TRUNK: Status: ACTIVE | Noted: 2021-12-21

## 2021-12-21 PROCEDURE — ? ADDITIONAL NOTES

## 2021-12-21 PROCEDURE — ? COUNSELING

## 2021-12-21 PROCEDURE — ? TREATMENT REGIMEN

## 2021-12-21 PROCEDURE — 99203 OFFICE O/P NEW LOW 30 MIN: CPT

## 2021-12-21 PROCEDURE — ? FULL BODY SKIN EXAM

## 2021-12-21 ASSESSMENT — LOCATION ZONE DERM: LOCATION ZONE: TRUNK

## 2021-12-21 ASSESSMENT — LOCATION SIMPLE DESCRIPTION DERM
LOCATION SIMPLE: ABDOMEN
LOCATION SIMPLE: RIGHT UPPER BACK
LOCATION SIMPLE: LEFT LOWER BACK

## 2021-12-21 ASSESSMENT — LOCATION DETAILED DESCRIPTION DERM
LOCATION DETAILED: RIGHT MID-UPPER BACK
LOCATION DETAILED: PERIUMBILICAL SKIN
LOCATION DETAILED: EPIGASTRIC SKIN
LOCATION DETAILED: LEFT SUPERIOR MEDIAL MIDBACK

## 2022-12-27 ENCOUNTER — APPOINTMENT (RX ONLY)
Dept: URBAN - METROPOLITAN AREA CLINIC 170 | Facility: CLINIC | Age: 47
Setting detail: DERMATOLOGY
End: 2022-12-27

## 2022-12-27 DIAGNOSIS — D18.0 HEMANGIOMA: ICD-10-CM | Status: STABLE

## 2022-12-27 DIAGNOSIS — L82.1 OTHER SEBORRHEIC KERATOSIS: ICD-10-CM | Status: STABLE

## 2022-12-27 DIAGNOSIS — D22 MELANOCYTIC NEVI: ICD-10-CM | Status: STABLE

## 2022-12-27 DIAGNOSIS — L81.4 OTHER MELANIN HYPERPIGMENTATION: ICD-10-CM | Status: STABLE

## 2022-12-27 PROBLEM — D22.5 MELANOCYTIC NEVI OF TRUNK: Status: ACTIVE | Noted: 2022-12-27

## 2022-12-27 PROBLEM — D18.01 HEMANGIOMA OF SKIN AND SUBCUTANEOUS TISSUE: Status: ACTIVE | Noted: 2022-12-27

## 2022-12-27 PROCEDURE — ? TREATMENT REGIMEN

## 2022-12-27 PROCEDURE — ? ADDITIONAL NOTES

## 2022-12-27 PROCEDURE — ? COUNSELING

## 2022-12-27 PROCEDURE — ? FULL BODY SKIN EXAM

## 2022-12-27 PROCEDURE — 99213 OFFICE O/P EST LOW 20 MIN: CPT

## 2022-12-27 ASSESSMENT — LOCATION SIMPLE DESCRIPTION DERM
LOCATION SIMPLE: LEFT LOWER BACK
LOCATION SIMPLE: RIGHT UPPER BACK
LOCATION SIMPLE: ABDOMEN

## 2022-12-27 ASSESSMENT — LOCATION DETAILED DESCRIPTION DERM
LOCATION DETAILED: LEFT SUPERIOR MEDIAL MIDBACK
LOCATION DETAILED: PERIUMBILICAL SKIN
LOCATION DETAILED: EPIGASTRIC SKIN
LOCATION DETAILED: RIGHT MID-UPPER BACK

## 2022-12-27 ASSESSMENT — LOCATION ZONE DERM: LOCATION ZONE: TRUNK

## 2022-12-27 NOTE — PROCEDURE: MIPS QUALITY
Controlled with current regime
Quality 226: Preventive Care And Screening: Tobacco Use: Screening And Cessation Intervention: Patient screened for tobacco use and is an ex/non-smoker
Quality 130: Documentation Of Current Medications In The Medical Record: Current Medications Documented
Detail Level: Detailed
Quality 431: Preventive Care And Screening: Unhealthy Alcohol Use - Screening: Documentation of medical reason(s) for not screening for unhealthy alcohol use (e.g., limited life expectancy, other medical reasons)

## 2023-12-26 ENCOUNTER — APPOINTMENT (RX ONLY)
Dept: URBAN - METROPOLITAN AREA CLINIC 170 | Facility: CLINIC | Age: 48
Setting detail: DERMATOLOGY
End: 2023-12-26

## 2023-12-26 DIAGNOSIS — D22 MELANOCYTIC NEVI: ICD-10-CM | Status: STABLE

## 2023-12-26 DIAGNOSIS — L82.1 OTHER SEBORRHEIC KERATOSIS: ICD-10-CM | Status: STABLE

## 2023-12-26 DIAGNOSIS — L81.4 OTHER MELANIN HYPERPIGMENTATION: ICD-10-CM | Status: STABLE

## 2023-12-26 DIAGNOSIS — D18.0 HEMANGIOMA: ICD-10-CM | Status: STABLE

## 2023-12-26 PROBLEM — D22.5 MELANOCYTIC NEVI OF TRUNK: Status: ACTIVE | Noted: 2023-12-26

## 2023-12-26 PROBLEM — D18.01 HEMANGIOMA OF SKIN AND SUBCUTANEOUS TISSUE: Status: ACTIVE | Noted: 2023-12-26

## 2023-12-26 PROCEDURE — 99213 OFFICE O/P EST LOW 20 MIN: CPT

## 2023-12-26 PROCEDURE — ? FULL BODY SKIN EXAM

## 2023-12-26 PROCEDURE — ? TREATMENT REGIMEN

## 2023-12-26 PROCEDURE — ? COUNSELING

## 2023-12-26 ASSESSMENT — LOCATION DETAILED DESCRIPTION DERM
LOCATION DETAILED: EPIGASTRIC SKIN
LOCATION DETAILED: PERIUMBILICAL SKIN
LOCATION DETAILED: RIGHT MID-UPPER BACK
LOCATION DETAILED: LEFT SUPERIOR MEDIAL MIDBACK

## 2023-12-26 ASSESSMENT — LOCATION SIMPLE DESCRIPTION DERM
LOCATION SIMPLE: RIGHT UPPER BACK
LOCATION SIMPLE: LEFT LOWER BACK
LOCATION SIMPLE: ABDOMEN

## 2023-12-26 ASSESSMENT — LOCATION ZONE DERM: LOCATION ZONE: TRUNK

## 2024-12-05 ENCOUNTER — APPOINTMENT (OUTPATIENT)
Dept: URBAN - METROPOLITAN AREA CLINIC 170 | Facility: CLINIC | Age: 49
Setting detail: DERMATOLOGY
End: 2024-12-05

## 2024-12-05 DIAGNOSIS — D22 MELANOCYTIC NEVI: ICD-10-CM | Status: STABLE

## 2024-12-05 DIAGNOSIS — D18.0 HEMANGIOMA: ICD-10-CM | Status: STABLE

## 2024-12-05 DIAGNOSIS — L82.1 OTHER SEBORRHEIC KERATOSIS: ICD-10-CM | Status: STABLE

## 2024-12-05 DIAGNOSIS — L81.4 OTHER MELANIN HYPERPIGMENTATION: ICD-10-CM | Status: STABLE

## 2024-12-05 PROBLEM — D18.01 HEMANGIOMA OF SKIN AND SUBCUTANEOUS TISSUE: Status: ACTIVE | Noted: 2024-12-05

## 2024-12-05 PROBLEM — D22.5 MELANOCYTIC NEVI OF TRUNK: Status: ACTIVE | Noted: 2024-12-05

## 2024-12-05 PROCEDURE — 99213 OFFICE O/P EST LOW 20 MIN: CPT

## 2024-12-05 PROCEDURE — ? FULL BODY SKIN EXAM

## 2024-12-05 PROCEDURE — ? TREATMENT REGIMEN

## 2024-12-05 PROCEDURE — ? COUNSELING

## 2024-12-05 ASSESSMENT — LOCATION DETAILED DESCRIPTION DERM
LOCATION DETAILED: MIDDLE STERNUM
LOCATION DETAILED: LEFT PROXIMAL DORSAL FOREARM
LOCATION DETAILED: RIGHT SUPERIOR UPPER BACK
LOCATION DETAILED: LEFT SUPERIOR UPPER BACK
LOCATION DETAILED: RIGHT MEDIAL SUPERIOR CHEST
LOCATION DETAILED: RIGHT MID-UPPER BACK
LOCATION DETAILED: UPPER STERNUM
LOCATION DETAILED: PERIUMBILICAL SKIN
LOCATION DETAILED: RIGHT DISTAL DORSAL FOREARM

## 2024-12-05 ASSESSMENT — LOCATION ZONE DERM
LOCATION ZONE: ARM
LOCATION ZONE: TRUNK

## 2024-12-05 ASSESSMENT — LOCATION SIMPLE DESCRIPTION DERM
LOCATION SIMPLE: CHEST
LOCATION SIMPLE: RIGHT UPPER BACK
LOCATION SIMPLE: LEFT UPPER BACK
LOCATION SIMPLE: RIGHT FOREARM
LOCATION SIMPLE: LEFT FOREARM
LOCATION SIMPLE: ABDOMEN

## 2024-12-05 NOTE — HPI: EVALUATION OF SKIN LESION(S)
What Type Of Note Output Would You Prefer (Optional)?: Bullet Format
Hpi Title: Evaluation of Skin Lesions
How Severe Are Your Spot(S)?: mild
Additional History: One on the low back that has been there for many years, not causing problems but wants it’s checked

## (undated) DEVICE — STERILE LATEX POWDER-FREE SURGICAL GLOVESWITH NITRILE COATING: Brand: PROTEXIS

## (undated) DEVICE — GLOVE SURG SZ 7 L12IN FNGR THK94MIL STD WHT ISOLEX LTX FREE

## (undated) DEVICE — 3M™ COBAN™ NL STERILE NON-LATEX SELF-ADHERENT WRAP, 2084S, 4 IN X 5 YD (10 CM X 4,5 M), 18 ROLLS/CASE: Brand: 3M™ COBAN™

## (undated) DEVICE — DRAPE 33X23IN INCISE ANTIMICROB IOBAN 2

## (undated) DEVICE — ROYAL SILK SURGICAL GOWN, XL: Brand: CONVERTORS

## (undated) DEVICE — COTTON UNDERCAST PADDING,REGULAR FINISH: Brand: WEBRIL

## (undated) DEVICE — CURITY ABDOMINAL PAD: Brand: CURITY

## (undated) DEVICE — SUTURE VCRL SZ 2-0 L27IN ABSRB UD L26MM CT-2 1/2 CIR J269H

## (undated) DEVICE — Device

## (undated) DEVICE — SOLUTION,SALINE,IRRGATION,500ML,STRL: Brand: MEDLINE

## (undated) DEVICE — SUTURE ETHLN SZ 4-0 L18IN NONABSORBABLE BLK L19MM PS-2 3/8 1667H

## (undated) DEVICE — SUTURE COAT VCRL SZ 4-0 L18IN ABSRB UD L19MM PS-2 1/2 CIR J496G